# Patient Record
Sex: MALE | Race: WHITE | NOT HISPANIC OR LATINO | Employment: FULL TIME | URBAN - METROPOLITAN AREA
[De-identification: names, ages, dates, MRNs, and addresses within clinical notes are randomized per-mention and may not be internally consistent; named-entity substitution may affect disease eponyms.]

---

## 2017-01-17 ENCOUNTER — COMMUNICATION - HEALTHEAST (OUTPATIENT)
Dept: FAMILY MEDICINE | Facility: CLINIC | Age: 37
End: 2017-01-17

## 2017-01-17 DIAGNOSIS — F17.200 TOBACCO USE DISORDER: ICD-10-CM

## 2017-10-04 ENCOUNTER — COMMUNICATION - HEALTHEAST (OUTPATIENT)
Dept: FAMILY MEDICINE | Facility: CLINIC | Age: 37
End: 2017-10-04

## 2017-10-04 DIAGNOSIS — F17.200 TOBACCO USE DISORDER: ICD-10-CM

## 2018-02-12 ENCOUNTER — TELEPHONE (OUTPATIENT)
Dept: OTHER | Facility: CLINIC | Age: 38
End: 2018-02-12

## 2018-02-12 NOTE — TELEPHONE ENCOUNTER
2/12/2018    Call Regarding Onboarding P1 Other    Attempt 1    Message on voicemail     Comments:       Outreach   Kimi Winslow

## 2018-03-05 ENCOUNTER — RECORDS - HEALTHEAST (OUTPATIENT)
Dept: ADMINISTRATIVE | Facility: OTHER | Age: 38
End: 2018-03-05

## 2018-04-04 ENCOUNTER — RECORDS - HEALTHEAST (OUTPATIENT)
Dept: ADMINISTRATIVE | Facility: OTHER | Age: 38
End: 2018-04-04

## 2018-05-16 ENCOUNTER — COMMUNICATION - HEALTHEAST (OUTPATIENT)
Dept: FAMILY MEDICINE | Facility: CLINIC | Age: 38
End: 2018-05-16

## 2018-05-21 ENCOUNTER — HOSPITAL ENCOUNTER (OUTPATIENT)
Dept: MRI IMAGING | Facility: CLINIC | Age: 38
Discharge: HOME | End: 2018-05-21
Attending: FAMILY MEDICINE

## 2018-05-21 ENCOUNTER — OFFICE VISIT - HEALTHEAST (OUTPATIENT)
Dept: FAMILY MEDICINE | Facility: CLINIC | Age: 38
End: 2018-05-21

## 2018-05-21 DIAGNOSIS — M54.50 LUMBAGO: ICD-10-CM

## 2018-05-21 DIAGNOSIS — F17.200 TOBACCO USE DISORDER: ICD-10-CM

## 2018-05-21 DIAGNOSIS — Z00.00 WELL ADULT EXAM: ICD-10-CM

## 2018-05-21 DIAGNOSIS — E78.5 HYPERLIPIDEMIA LDL GOAL <130: ICD-10-CM

## 2018-05-21 DIAGNOSIS — M54.16 LUMBAR RADICULOPATHY: ICD-10-CM

## 2018-05-21 LAB
ALBUMIN SERPL-MCNC: 4 G/DL (ref 3.5–5)
ALP SERPL-CCNC: 102 U/L (ref 45–120)
ALT SERPL W P-5'-P-CCNC: 37 U/L (ref 0–45)
ANION GAP SERPL CALCULATED.3IONS-SCNC: 11 MMOL/L (ref 5–18)
AST SERPL W P-5'-P-CCNC: 40 U/L (ref 0–40)
BILIRUB DIRECT SERPL-MCNC: 0.1 MG/DL
BILIRUB SERPL-MCNC: 0.4 MG/DL (ref 0–1)
BUN SERPL-MCNC: 18 MG/DL (ref 8–22)
CALCIUM SERPL-MCNC: 9.6 MG/DL (ref 8.5–10.5)
CHLORIDE BLD-SCNC: 103 MMOL/L (ref 98–107)
CO2 SERPL-SCNC: 26 MMOL/L (ref 22–31)
CREAT SERPL-MCNC: 1.13 MG/DL (ref 0.7–1.3)
ERYTHROCYTE [DISTWIDTH] IN BLOOD BY AUTOMATED COUNT: 12 % (ref 11–14.5)
GFR SERPL CREATININE-BSD FRML MDRD: >60 ML/MIN/1.73M2
GLUCOSE BLD-MCNC: 76 MG/DL (ref 70–125)
HCT VFR BLD AUTO: 44.7 % (ref 40–54)
HGB BLD-MCNC: 14.7 G/DL (ref 14–18)
LDLC SERPL CALC-MCNC: 95 MG/DL
MCH RBC QN AUTO: 28.9 PG (ref 27–34)
MCHC RBC AUTO-ENTMCNC: 32.9 G/DL (ref 32–36)
MCV RBC AUTO: 88 FL (ref 80–100)
PLATELET # BLD AUTO: 321 THOU/UL (ref 140–440)
PMV BLD AUTO: 7.2 FL (ref 7–10)
POTASSIUM BLD-SCNC: 4.6 MMOL/L (ref 3.5–5)
PROT SERPL-MCNC: 7.5 G/DL (ref 6–8)
RBC # BLD AUTO: 5.09 MILL/UL (ref 4.4–6.2)
SODIUM SERPL-SCNC: 140 MMOL/L (ref 136–145)
WBC: 7.9 THOU/UL (ref 4–11)

## 2018-05-21 ASSESSMENT — MIFFLIN-ST. JEOR: SCORE: 1719.68

## 2018-05-22 ENCOUNTER — HOSPITAL ENCOUNTER (OUTPATIENT)
Dept: PHYSICAL MEDICINE AND REHAB | Facility: CLINIC | Age: 38
Discharge: HOME | End: 2018-05-22
Attending: FAMILY MEDICINE

## 2018-05-22 ENCOUNTER — COMMUNICATION - HEALTHEAST (OUTPATIENT)
Dept: FAMILY MEDICINE | Facility: CLINIC | Age: 38
End: 2018-05-22

## 2018-05-22 DIAGNOSIS — M54.50 LUMBALGIA: ICD-10-CM

## 2018-05-22 DIAGNOSIS — G47.9 SLEEP DISTURBANCE: ICD-10-CM

## 2018-05-22 DIAGNOSIS — M87.052 AVASCULAR NECROSIS OF BONE OF HIP, LEFT (H): ICD-10-CM

## 2018-05-22 DIAGNOSIS — M54.16 LUMBAR RADICULITIS: ICD-10-CM

## 2018-05-22 DIAGNOSIS — M79.18 MYOFASCIAL PAIN: ICD-10-CM

## 2018-05-22 DIAGNOSIS — M51.26 LUMBAR DISC HERNIATION: ICD-10-CM

## 2018-05-22 ASSESSMENT — MIFFLIN-ST. JEOR: SCORE: 1716.96

## 2018-06-06 ENCOUNTER — RECORDS - HEALTHEAST (OUTPATIENT)
Dept: ADMINISTRATIVE | Facility: OTHER | Age: 38
End: 2018-06-06

## 2018-06-06 ENCOUNTER — TRANSFERRED RECORDS (OUTPATIENT)
Dept: HEALTH INFORMATION MANAGEMENT | Facility: CLINIC | Age: 38
End: 2018-06-06

## 2018-06-13 ENCOUNTER — RECORDS - HEALTHEAST (OUTPATIENT)
Dept: ADMINISTRATIVE | Facility: OTHER | Age: 38
End: 2018-06-13

## 2018-06-25 ENCOUNTER — COMMUNICATION - HEALTHEAST (OUTPATIENT)
Dept: FAMILY MEDICINE | Facility: CLINIC | Age: 38
End: 2018-06-25

## 2018-06-26 ENCOUNTER — OFFICE VISIT - HEALTHEAST (OUTPATIENT)
Dept: FAMILY MEDICINE | Facility: CLINIC | Age: 38
End: 2018-06-26

## 2018-06-26 DIAGNOSIS — Z01.818 ENCOUNTER FOR PREOPERATIVE EXAMINATION FOR GENERAL SURGICAL PROCEDURE: ICD-10-CM

## 2018-06-26 DIAGNOSIS — M87.052 AVASCULAR NECROSIS OF HIP, LEFT (H): ICD-10-CM

## 2018-06-26 LAB
ANION GAP SERPL CALCULATED.3IONS-SCNC: 9 MMOL/L (ref 5–18)
BUN SERPL-MCNC: 18 MG/DL (ref 8–22)
CALCIUM SERPL-MCNC: 10.2 MG/DL (ref 8.5–10.5)
CHLORIDE BLD-SCNC: 107 MMOL/L (ref 98–107)
CO2 SERPL-SCNC: 24 MMOL/L (ref 22–31)
CREAT SERPL-MCNC: 1.07 MG/DL (ref 0.7–1.3)
ERYTHROCYTE [DISTWIDTH] IN BLOOD BY AUTOMATED COUNT: 12.3 % (ref 11–14.5)
GFR SERPL CREATININE-BSD FRML MDRD: >60 ML/MIN/1.73M2
GLUCOSE BLD-MCNC: 74 MG/DL (ref 70–125)
HCT VFR BLD AUTO: 45.6 % (ref 40–54)
HGB BLD-MCNC: 15.1 G/DL (ref 14–18)
MCH RBC QN AUTO: 28.6 PG (ref 27–34)
MCHC RBC AUTO-ENTMCNC: 33.1 G/DL (ref 32–36)
MCV RBC AUTO: 86 FL (ref 80–100)
PLATELET # BLD AUTO: 298 THOU/UL (ref 140–440)
PMV BLD AUTO: 7.4 FL (ref 7–10)
POTASSIUM BLD-SCNC: 4.5 MMOL/L (ref 3.5–5)
RBC # BLD AUTO: 5.29 MILL/UL (ref 4.4–6.2)
SODIUM SERPL-SCNC: 140 MMOL/L (ref 136–145)
WBC: 5.6 THOU/UL (ref 4–11)

## 2018-06-26 ASSESSMENT — MIFFLIN-ST. JEOR: SCORE: 1729.32

## 2018-07-19 ENCOUNTER — RECORDS - HEALTHEAST (OUTPATIENT)
Dept: ADMINISTRATIVE | Facility: OTHER | Age: 38
End: 2018-07-19

## 2018-07-22 ENCOUNTER — APPOINTMENT (OUTPATIENT)
Dept: CT IMAGING | Facility: CLINIC | Age: 38
End: 2018-07-22
Attending: EMERGENCY MEDICINE
Payer: COMMERCIAL

## 2018-07-22 ENCOUNTER — RECORDS - HEALTHEAST (OUTPATIENT)
Dept: ADMINISTRATIVE | Facility: OTHER | Age: 38
End: 2018-07-22

## 2018-07-22 ENCOUNTER — HOSPITAL ENCOUNTER (EMERGENCY)
Facility: CLINIC | Age: 38
Discharge: HOME | End: 2018-07-22
Attending: EMERGENCY MEDICINE | Admitting: EMERGENCY MEDICINE
Payer: COMMERCIAL

## 2018-07-22 VITALS
HEART RATE: 78 BPM | OXYGEN SATURATION: 92 % | DIASTOLIC BLOOD PRESSURE: 88 MMHG | TEMPERATURE: 98 F | SYSTOLIC BLOOD PRESSURE: 121 MMHG | RESPIRATION RATE: 9 BRPM

## 2018-07-22 DIAGNOSIS — Z98.890 S/P ARTHROSCOPY: ICD-10-CM

## 2018-07-22 DIAGNOSIS — R06.00 DYSPNEA, UNSPECIFIED TYPE: ICD-10-CM

## 2018-07-22 LAB
ANION GAP SERPL CALCULATED.3IONS-SCNC: 7 MMOL/L (ref 3–14)
BASOPHILS # BLD AUTO: 0.1 10E9/L (ref 0–0.2)
BASOPHILS NFR BLD AUTO: 0.7 %
BUN SERPL-MCNC: 11 MG/DL (ref 7–30)
CALCIUM SERPL-MCNC: 8.7 MG/DL (ref 8.5–10.1)
CHLORIDE SERPL-SCNC: 104 MMOL/L (ref 94–109)
CO2 SERPL-SCNC: 30 MMOL/L (ref 20–32)
CREAT SERPL-MCNC: 0.96 MG/DL (ref 0.66–1.25)
DIFFERENTIAL METHOD BLD: ABNORMAL
EOSINOPHIL # BLD AUTO: 0.2 10E9/L (ref 0–0.7)
EOSINOPHIL NFR BLD AUTO: 2.4 %
ERYTHROCYTE [DISTWIDTH] IN BLOOD BY AUTOMATED COUNT: 14.6 % (ref 10–15)
GFR SERPL CREATININE-BSD FRML MDRD: 88 ML/MIN/1.7M2
GLUCOSE SERPL-MCNC: 91 MG/DL (ref 70–99)
HCT VFR BLD AUTO: 39.2 % (ref 40–53)
HGB BLD-MCNC: 12.9 G/DL (ref 13.3–17.7)
IMM GRANULOCYTES # BLD: 0.1 10E9/L (ref 0–0.4)
IMM GRANULOCYTES NFR BLD: 0.7 %
LYMPHOCYTES # BLD AUTO: 1.3 10E9/L (ref 0.8–5.3)
LYMPHOCYTES NFR BLD AUTO: 16.6 %
MCH RBC QN AUTO: 28.9 PG (ref 26.5–33)
MCHC RBC AUTO-ENTMCNC: 32.9 G/DL (ref 31.5–36.5)
MCV RBC AUTO: 88 FL (ref 78–100)
MONOCYTES # BLD AUTO: 0.6 10E9/L (ref 0–1.3)
MONOCYTES NFR BLD AUTO: 7.5 %
NEUTROPHILS # BLD AUTO: 5.4 10E9/L (ref 1.6–8.3)
NEUTROPHILS NFR BLD AUTO: 72.1 %
NRBC # BLD AUTO: 0 10*3/UL
NRBC BLD AUTO-RTO: 0 /100
PLATELET # BLD AUTO: 258 10E9/L (ref 150–450)
POTASSIUM SERPL-SCNC: 3.6 MMOL/L (ref 3.4–5.3)
RBC # BLD AUTO: 4.46 10E12/L (ref 4.4–5.9)
SODIUM SERPL-SCNC: 141 MMOL/L (ref 133–144)
TROPONIN I SERPL-MCNC: <0.015 UG/L (ref 0–0.04)
WBC # BLD AUTO: 7.5 10E9/L (ref 4–11)

## 2018-07-22 PROCEDURE — 85025 COMPLETE CBC W/AUTO DIFF WBC: CPT | Performed by: EMERGENCY MEDICINE

## 2018-07-22 PROCEDURE — 84484 ASSAY OF TROPONIN QUANT: CPT | Performed by: EMERGENCY MEDICINE

## 2018-07-22 PROCEDURE — 25000128 H RX IP 250 OP 636: Performed by: EMERGENCY MEDICINE

## 2018-07-22 PROCEDURE — 71260 CT THORAX DX C+: CPT

## 2018-07-22 PROCEDURE — 80048 BASIC METABOLIC PNL TOTAL CA: CPT | Performed by: EMERGENCY MEDICINE

## 2018-07-22 PROCEDURE — 99284 EMERGENCY DEPT VISIT MOD MDM: CPT | Mod: 25

## 2018-07-22 PROCEDURE — 93005 ELECTROCARDIOGRAM TRACING: CPT

## 2018-07-22 RX ORDER — IOPAMIDOL 755 MG/ML
500 INJECTION, SOLUTION INTRAVASCULAR ONCE
Status: COMPLETED | OUTPATIENT
Start: 2018-07-22 | End: 2018-07-22

## 2018-07-22 RX ORDER — KETOROLAC TROMETHAMINE 15 MG/ML
15 INJECTION, SOLUTION INTRAMUSCULAR; INTRAVENOUS ONCE
Status: COMPLETED | OUTPATIENT
Start: 2018-07-22 | End: 2018-07-22

## 2018-07-22 RX ORDER — SODIUM CHLORIDE 9 MG/ML
INJECTION, SOLUTION INTRAVENOUS CONTINUOUS
Status: DISCONTINUED | OUTPATIENT
Start: 2018-07-22 | End: 2018-07-22 | Stop reason: HOSPADM

## 2018-07-22 RX ADMIN — SODIUM CHLORIDE 80 ML: 900 INJECTION, SOLUTION INTRAVENOUS at 12:42

## 2018-07-22 RX ADMIN — KETOROLAC TROMETHAMINE 15 MG: 15 INJECTION, SOLUTION INTRAMUSCULAR; INTRAVENOUS at 12:05

## 2018-07-22 RX ADMIN — IOPAMIDOL 65 ML: 755 INJECTION, SOLUTION INTRAVENOUS at 12:42

## 2018-07-22 RX ADMIN — SODIUM CHLORIDE: 9 INJECTION, SOLUTION INTRAVENOUS at 12:15

## 2018-07-22 NOTE — ED TRIAGE NOTES
ED Triage Note:    EMS arrival after patient called because of shortness of breath, vomiting and dizziness following pain medication this AM.  He states that he woke up this morning and took his Oxycodone, started feeling short of breath and had some chest discomfort.  He then vomited, felt lightheaded and dizzy.      He is currently stating that his chest burns but is not nauseated.     Had an hip procedure done 4 days ago for avascular necrosis of the left hip.  States that he took a long time to recover from the anaethesia.

## 2018-07-22 NOTE — ED AVS SNAPSHOT
[facility] Emergency Department    [address]    [facility] [address]    Phone:  [phone]    Fax:  [phone]                                       Jimmy López   MRN: [id number]    Department:  [facility] Emergency Department   Date of Visit:  7/22/2018           After Visit Summary Signature Page     I have received my discharge instructions, and my questions have been answered. I have discussed any challenges I see with this plan with the nurse or doctor.    ..........................................................................................................................................  Patient/Patient Representative Signature      ..........................................................................................................................................  Patient Representative Print Name and Relationship to Patient    ..................................................               ................................................  Date                                            Time    ..........................................................................................................................................  Reviewed by Signature/Title    ...................................................              ..............................................  Date                                                            Time

## 2018-07-22 NOTE — DISCHARGE INSTRUCTIONS
Pain medications:    1.  Acetaminophen 1000mg three times a day for the next 5 days, scheduled during day  2.  Oxycodone 5mg tablet, one tablet every 6 hours. May take one additional pill if needed but try not to.  Over next 2 days, you can start taking the oxycodone only if you need, not on a scheduled basis.   3.  Try not to use the hydroxyzine medication due to sleepiness but can take one before bed if you need.   4.  Continue the diclofenac medication.     Do your incentive inspirometer (breathing machine we gave you) every hour while you are awake for the next 3 days.     Shortness of Breath (Dyspnea)  Shortness of breath is the feeling that you can't catch your breath or get enough air. It is also known as dyspnea.  Dyspnea can be caused by many different conditions. They include:    Acute asthma attack    Worsening of chronic lung diseases such as chronic bronchitis and emphysema    Heart failure. This is when weak heart muscle allows extra fluid to collect in the lungs.    Panic attacks or anxiety. Fear can cause rapid breathing (hyperventilation).    Pneumonia, or an infection in the lung tissue    Exposure to toxic substances, fumes, smoke, or certain medicines    Blood clot in the lung (pulmonary embolism). This is often from a piece of blood clot in a deep vein of the leg (deep vein thrombosis) that breaks off and travels to the lungs.    Heart attack or heart-related chest pain (angina)    Anemia    Collapsed lung (pneumothorax)    Dehydration    Pregnancy  Based on your visit today, the exact cause of your shortness of breath is not certain. Your tests don t show any of the serious causes of dyspnea. You may need other tests to find out if you have a serious problem. It s important to watch for any new symptoms or symptoms that get worse. Follow up with your healthcare provider as directed.  Home care  Follow these tips to take care of yourself at home:    When your symptoms are better, go back to  your usual activities.    If you smoke, you should stop. Join a quit-smoking program or ask your healthcare provider for help.    Eat a healthy diet and get plenty of sleep.    Get regular exercise. Talk with your healthcare provider before starting to exercise, especially if you have other medical problems.    Cut down on the amount of caffeine and stimulants you consume.  Follow-up care  Follow up with your healthcare provider, or as advised.  If tests were done, you will be told if your treatment needs to be changed. You can call as directed for the results.  If an X-ray was taken, a specialist will review it. You will be notified of any new findings that may affect your care.  Call 911  Shortness of breath may be a sign of a serious medical problem. For example, it may be a problem with your heart or lungs. Call 911 if you have worsening shortness of breath or trouble breathing, especially with any of the symptoms below:    You are confused or it s difficult to wake you.    You faint or lose consciousness.    You have a fast heartbeat, or your heartbeat is irregular.    You are coughing up blood.    You have pain in your chest, arm, shoulder, neck, or upper back.    You break out in a sweat.  When to seek medical advice  Call your healthcare provider right away if any of these occur:    Slight shortness of breath or wheezing    Redness, pain or swelling in your leg, arm, or other body area    Swelling in both legs or ankles    Fast weight gain    Dizziness or weakness    Fever of 100.4 F (38 C) or higher, or as directed by your healthcare provider  Date Last Reviewed: 9/13/2015 2000-2017 [employer]. [address] [place] [address], [place] [address]. All rights reserved. This information is not intended as a substitute for professional medical care. Always follow your healthcare professional's instructions.

## 2018-07-22 NOTE — ED NOTES
Bed: ED10  Expected date: 7/22/18  Expected time:   Means of arrival: Ambulance  Comments:  Ger Olivares

## 2018-07-22 NOTE — ED PROVIDER NOTES
History     Chief Complaint:  Shortness of Breath    HPI   Jimmy López is a 38 year old male who presents to the emergency department today via EMS for evaluation of shortness of breath.  The patient is rehabbing from a left hip surgery.  The patient notes that he is pain medicine this morning and had some anterior chest pain that is pleuritic in nature as well as some dyspnea he feels a bit better.  Family is noted he does appear a bit sleepy but he did not sleep well last night.  The patient was noted by the RN to have slightly lower oxygen saturations.  The patient has had no cough, hemoptysis, fevers history of DVT or PE, left leg swelling or other new symptoms..    Allergies:  No Known Drug Allergies    Medications:    Zoloft  Wellbutrin     Past Medical History:    Sciatica  Hyperlipidemia  PTSD  Depression  Anxiety  Lumbar herniated disc    Past Surgical History:    History reviewed. No pertinent past surgical history.    Family History:    Diabetes  Depression  Cancer    Social History:  The patient was accompanied to the ED by family.  Smoking Status: Current Every Day Smoker  Smokeless Tobacco: Never  Alcohol Use: Yes  Marital Status:       Review of Systems  See HPI a 10 point review of systems was assessed and is otherwise negative for acute abnormality.    Physical Exam     Patient Vitals for the past 24 hrs:   BP Temp Temp src Pulse Resp SpO2   07/22/18 1136 141/87 98  F (36.7  C) Oral 78 16 92 %         Physical Exam  General: Patient is alert and interactive when I enter the room but he appears sleepy on exam  Head:  The scalp, face, and head appear normal  Eyes:  The pupils are equal, round, and reactive to light    Conjunctivae and sclerae are normal  ENT:    External acoustic canals are normal    The oropharynx is normal without erythema.     Uvula is in the midline  Neck:  Normal range of motion  CV:  Regular rate. S1/S2. No murmurs.   Resp:  Lungs are clear without wheezes or  rales. No distress  GI:  Abdomen is soft, no rigidity, guarding, or rebound    No distension. No tenderness to palpation in any quadrant.     MS:  Normal tone. Joints grossly normal without effusions.     No asymmetric leg swelling, calf or thigh tenderness.      Normal motor assessment of all extremities.  I did not do range of motion of the left hip as is in a brace.  There is no left leg swelling however.  Skin:  No rash or lesions noted. Normal capillary refill noted  Neuro: Speech is normal and fluent. Face is symmetric.     Moving all extremities well.   Psych:  Awake. Alert.  Normal affect.  Appropriate interactions.  Lymph: No anterior cervical lymphadenopathy noted        Emergency Department Course     ECG:  EKG at 11:45 AM: Ventricular rate 78, AL interval 130, QRS is 100, QTc 442, axis is normal, there is no ST elevation, there is no significant ST depression.  There are nonspecific changes but not into contiguous leads that are worrisome.  Interpretation normal EKG.    Imaging:  Radiology findings were communicated with the patient and family who voiced understanding of the findings.  CT Chest Pulmonary Embolism w Contrast  IMPRESSION:   1. No pulmonary embolism demonstrated.  2. No thoracic aortic dissection or aneurysm.   3. 3 mm right upper lobe nodule.  Report per radiology     Laboratory:  Laboratory findings were communicated with the patient and family who voiced understanding of the findings.  CBC: HGB 12.9 (L) o/w WNL. (WBC 7.5, )   BMP: AWNL (Creatinine 0.96)  Troponin (Collected 1145): <0.015    Interventions:  1205 Toradol 15 mg IV  1215 NS infusion 1,000mL IV    Emergency Department Course:  Nursing notes and vitals reviewed.  IV was inserted and blood was drawn for laboratory testing, results above.  The patient was sent for a CT Chest Pulmonary Embolism w Contrast while in the emergency department, results above.   I performed an exam of the patient as documented above.   Patient  rechecked and updated.   Findings and plan explained to the Patient. Patient discharged home with instructions regarding supportive care, medications, and reasons to return. The importance of close follow-up was reviewed.  I personally reviewed the laboratory, imaging, and EKG results with the Patient and answered all related questions prior to discharge.    Impression & Plan    Medical Decision Makin-year-old male presents for evaluation of dyspnea.  He appears sleepy on initial exam.  A broad differential was of course considered including pulmonary embolism, pneumonia, atelectasis, etc.  The patient looks excellent here and I am not particularly worried about him although he does look like he is a bit overmedicated with multiple sedating medications prescribed him after surgery.  I recommended that we change her on his pain regimen a little bit to make him less sedated.  We will give him incentive inspirometer to help as well with this the patient is stable for discharge home with the CT findings as noted above in the blood work as noted above.  I do not would not hospitalize at this point for acute coronary syndrome.    Diagnosis:    ICD-10-CM    1. Dyspnea, unspecified type R06.00    2. S/P arthroscopy Z98.890        Disposition:  discharged to home    2018   [facility] EMERGENCY DEPARTMENT       Gume Savage MD  [id number]

## 2018-07-22 NOTE — ED NOTES
ED Nursing Note:    Incentive spirometer given to patient and he was instructed in it's use.  Return demonstration adequate.    Encouraged him to use it every 1-2 hours 5-10 times per session.   Explained the purpose of IS, prevention of pneumonia and promote good lung expansion.   Verbalized and demonstrated understanding of instructions.

## 2018-07-22 NOTE — ED AVS SNAPSHOT
[facility]    [address]    [facility] [address]    Phone:  [phone]    Fax:  [phone]                                       Jimmy López   MRN: [id number]    Department:  [facility]   Date of Visit:  7/22/2018           Patient Information     Date Of Birth          1980        Your diagnoses for this visit were:     Dyspnea, unspecified type     S/P arthroscopy        You were seen by Gume Savage MD.      Follow-up Information     Please follow up.    Why:  your orthopedic surgeon next week        Discharge Instructions         Pain medications:    1.  Acetaminophen 1000mg three times a day for the next 5 days, scheduled during day  2.  Oxycodone 5mg tablet, one tablet every 6 hours. May take one additional pill if needed but try not to.  Over next 2 days, you can start taking the oxycodone only if you need, not on a scheduled basis.   3.  Try not to use the hydroxyzine medication due to sleepiness but can take one before bed if you need.   4.  Continue the diclofenac medication.     Do your incentive inspirometer (breathing machine we gave you) every hour while you are awake for the next 3 days.     Shortness of Breath (Dyspnea)  Shortness of breath is the feeling that you can't catch your breath or get enough air. It is also known as dyspnea.  Dyspnea can be caused by many different conditions. They include:    Acute asthma attack    Worsening of chronic lung diseases such as chronic bronchitis and emphysema    Heart failure. This is when weak heart muscle allows extra fluid to collect in the lungs.    Panic attacks or anxiety. Fear can cause rapid breathing (hyperventilation).    Pneumonia, or an infection in the lung tissue    Exposure to toxic substances, fumes, smoke, or certain medicines    Blood clot in the lung (pulmonary embolism). This is often from a piece of blood clot in a deep vein of the  leg (deep vein thrombosis) that breaks off and travels to the lungs.    Heart attack or heart-related chest pain (angina)    Anemia    Collapsed lung (pneumothorax)    Dehydration    Pregnancy  Based on your visit today, the exact cause of your shortness of breath is not certain. Your tests don t show any of the serious causes of dyspnea. You may need other tests to find out if you have a serious problem. It s important to watch for any new symptoms or symptoms that get worse. Follow up with your healthcare provider as directed.  Home care  Follow these tips to take care of yourself at home:    When your symptoms are better, go back to your usual activities.    If you smoke, you should stop. Join a quit-smoking program or ask your healthcare provider for help.    Eat a healthy diet and get plenty of sleep.    Get regular exercise. Talk with your healthcare provider before starting to exercise, especially if you have other medical problems.    Cut down on the amount of caffeine and stimulants you consume.  Follow-up care  Follow up with your healthcare provider, or as advised.  If tests were done, you will be told if your treatment needs to be changed. You can call as directed for the results.  If an X-ray was taken, a specialist will review it. You will be notified of any new findings that may affect your care.  Call 911  Shortness of breath may be a sign of a serious medical problem. For example, it may be a problem with your heart or lungs. Call 911 if you have worsening shortness of breath or trouble breathing, especially with any of the symptoms below:    You are confused or it s difficult to wake you.    You faint or lose consciousness.    You have a fast heartbeat, or your heartbeat is irregular.    You are coughing up blood.    You have pain in your chest, arm, shoulder, neck, or upper back.    You break out in a sweat.  When to seek medical advice  Call your healthcare provider right away if any of these  occur:    Slight shortness of breath or wheezing    Redness, pain or swelling in your leg, arm, or other body area    Swelling in both legs or ankles    Fast weight gain    Dizziness or weakness    Fever of 100.4 F (38 C) or higher, or as directed by your healthcare provider  Date Last Reviewed: 9/13/2015 2000-2017 [employer]. [address] [place] [address]. All rights reserved. This information is not intended as a substitute for professional medical care. Always follow your healthcare professional's instructions.          24 Hour Appointment Hotline       To make an appointment at any [facility], call [phone] ([phone]). If you don't have a family doctor or clinic, we will help you find one. [facility] clinics are conveniently located to serve the needs of you and your family.             Review of your medicines      Our records show that you are taking the medicines listed below. If these are incorrect, please call your family doctor or clinic.        Dose / Directions Last dose taken    DICLOFENAC SODIUM PO   Dose:  75 mg        Take 75 mg by mouth 2 times daily   Refills:  0        HYDROXYZINE PAMOATE PO   Dose:  25 mg        Take 25 mg by mouth every 4 hours as needed for itching   Refills:  0        OXYCODONE HCL PO   Dose:  5 mg        Take 5 mg by mouth every 4 hours as needed   Refills:  0        SERTRALINE HCL PO   Dose:  100 mg        Take 100 mg by mouth daily   Refills:  0                Information about OPIOIDS     PRESCRIPTION OPIOIDS: WHAT YOU NEED TO KNOW   We gave you an opioid (narcotic) pain medicine. It is important to manage your pain, but opioids are not always the best choice. You should first try all the other options your care team gave you. Take this medicine for as short a time (and as few doses) as possible.     These medicines have risks:    DO NOT drive when on new or higher doses of pain medicine. These medicines can affect your  alertness and reaction times, and you could be arrested for driving under the influence (DUI). If you need to use opioids long-term, talk to your care team about driving.    DO NOT operate heave machinery    DO NOT do any other dangerous activities while taking these medicines.     DO NOT drink any alcohol while taking these medicines.      If the opioid prescribed includes acetaminophen, DO NOT take with any other medicines that contain acetaminophen. Read all labels carefully. Look for the word  acetaminophen  or  Tylenol.  Ask your pharmacist if you have questions or are unsure.    You can get addicted to pain medicines, especially if you have a history of addiction (chemical, alcohol or substance dependence). Talk to your care team about ways to reduce this risk.    Store your pills in a secure place, locked if possible. We will not replace any lost or stolen medicine. If you don t finish your medicine, please throw away (dispose) as directed by your pharmacist. The [place] Pollution Control Agency has more information about safe disposal: https://[email or web address].[email or web address]/living-[email or web address]/[email or web address]-medications.     All opioids tend to cause constipation. Drink plenty of water and eat foods that have a lot of fiber, such as fruits, vegetables, prune juice, apple juice and high-fiber cereal. Take a laxative (Miralax, milk of magnesia, Colace, Senna) if you don t move your bowels at least every other day.         Procedures and tests performed during your visit     Basic metabolic panel    CBC with platelets differential    CT Chest Pulmonary Embolism w Contrast    EKG 12 lead    Peripheral IV catheter    Troponin I      Orders Needing Specimen Collection     None      Pending Results     Date and Time Order Name Status Description    7/22/2018 1151 CT Chest Pulmonary Embolism w Contrast Preliminary     7/22/2018 1130 EKG 12 lead Preliminary             Pending Culture Results     No orders found from 7/20/2018 to  7/23/2018.            Pending Results Instructions     If you had any lab results that were not finalized at the time of your Discharge, you can call the ED Lab Result RN at [phone]. You will be contacted by this team for any positive Lab results or changes in treatment. The nurses are available 7 days a week from 10A to 6:30P.  You can leave a message 24 hours per day and they will return your call.        Test Results From Your Hospital Stay        7/22/2018 12:14 PM      Component Results     Component Value Ref Range & Units Status    WBC 7.5 4.0 - 11.0 10e9/L Final    RBC Count 4.46 4.4 - 5.9 10e12/L Final    Hemoglobin 12.9 (L) 13.3 - 17.7 g/dL Final    Hematocrit 39.2 (L) 40.0 - 53.0 % Final    MCV 88 78 - 100 fl Final    MCH 28.9 26.5 - 33.0 pg Final    MCHC 32.9 31.5 - 36.5 g/dL Final    RDW 14.6 10.0 - 15.0 % Final    Platelet Count 258 150 - 450 10e9/L Final    Diff Method Automated Method  Final    % Neutrophils 72.1 % Final    % Lymphocytes 16.6 % Final    % Monocytes 7.5 % Final    % Eosinophils 2.4 % Final    % Basophils 0.7 % Final    % Immature Granulocytes 0.7 % Final    Nucleated RBCs 0 0 /100 Final    Absolute Neutrophil 5.4 1.6 - 8.3 10e9/L Final    Absolute Lymphocytes 1.3 0.8 - 5.3 10e9/L Final    Absolute Monocytes 0.6 0.0 - 1.3 10e9/L Final    Absolute Eosinophils 0.2 0.0 - 0.7 10e9/L Final    Absolute Basophils 0.1 0.0 - 0.2 10e9/L Final    Abs Immature Granulocytes 0.1 0 - 0.4 10e9/L Final    Absolute Nucleated RBC 0.0  Final         7/22/2018 12:36 PM      Component Results     Component Value Ref Range & Units Status    Sodium 141 133 - 144 mmol/L Final    Potassium 3.6 3.4 - 5.3 mmol/L Final    Chloride 104 94 - 109 mmol/L Final    Carbon Dioxide 30 20 - 32 mmol/L Final    Anion Gap 7 3 - 14 mmol/L Final    Glucose 91 70 - 99 mg/dL Final    Urea Nitrogen 11 7 - 30 mg/dL Final    Creatinine 0.96 0.66 - 1.25 mg/dL Final    GFR Estimate 88 >60 mL/min/1.7m2 Final    Non   American GFR Calc    GFR Estimate If Black >90 >60 mL/min/1.7m2 Final    African American GFR Calc    Calcium 8.7 8.5 - 10.1 mg/dL Final         7/22/2018 12:58 PM      Narrative     CT CHEST PULMONARY EMBOLISM WITH CONTRAST  7/22/2018 12:53 PM     HISTORY: Recent hip surgery.    COMPARISON: None.    TECHNIQUE: Volumetric helical acquisition of CT images of the chest  from the lung apices to the kidneys were acquired after the  administration of 65mL Isovue-370 IV contrast. Radiation dose for this  scan was reduced using automated exposure control, adjustment of the  mA and/or kV according to patient size, or iterative reconstruction  technique.    FINDINGS: There is no pulmonary embolism. 3 mm nodule in the lateral  right upper lobe image 131 series 6. Minimal bibasilar probable  atelectasis versus less likely infiltrate. Trace pleural fluid  bilaterally. The heart is not enlarged. Thoracic aorta is unremarkable  without evidence of dissection or aneurysm. No pericardial effusion.   There is no pneumothorax. Adrenal glands are not well seen. Remainder  of the visualized upper abdomen is unremarkable.        Impression     IMPRESSION:   1. No pulmonary embolism demonstrated.  2. No thoracic aortic dissection or aneurysm.   3. 3 mm right upper lobe nodule.    Recommendations for one or multiple incidental lung nodules < 6mm :    Low risk patients: No routine follow-up.    High risk patients: Optional follow-up CT at 12 months; if  unchanged, no further follow-up.    *Low Risk: Minimal or absent history of smoking or other known risk  factors.  *Nonsolid (ground glass) or partly solid nodules may require longer  follow-up to exclude indolent adenocarcinoma.  *Recommendations based on Guidelines for the Management of Incidental  Pulmonary Nodules Detected at CT: From the Fleischner Society 2017,  Radiology 2017.         7/22/2018 12:36 PM      Component Results     Component Value Ref Range & Units Status    Troponin I  ES <0.015 0.000 - 0.045 ug/L Final    The 99th percentile for upper reference range is 0.045 ug/L.  Troponin values   in the range of 0.045 - 0.120 ug/L may be associated with risks of adverse   clinical events.                  Clinical Quality Measure: Blood Pressure Screening     Your blood pressure was checked while you were in the emergency department today. The last reading we obtained was  BP: 121/88 . Please read the guidelines below about what these numbers mean and what you should do about them.  If your systolic blood pressure (the top number) is less than 120 and your diastolic blood pressure (the bottom number) is less than 80, then your blood pressure is normal. There is nothing more that you need to do about it.  If your systolic blood pressure (the top number) is 120-139 or your diastolic blood pressure (the bottom number) is 80-89, your blood pressure may be higher than it should be. You should have your blood pressure rechecked within a year by a primary care provider.  If your systolic blood pressure (the top number) is 140 or greater or your diastolic blood pressure (the bottom number) is 90 or greater, you may have high blood pressure. High blood pressure is treatable, but if left untreated over time it can put you at risk for heart attack, stroke, or kidney failure. You should have your blood pressure rechecked by a primary care provider within the next 4 weeks.  If your provider in the emergency department today gave you specific instructions to follow-up with your doctor or provider even sooner than that, you should follow that instruction and not wait for up to 4 weeks for your follow-up visit.        Thank you for choosing [facility]       Thank you for choosing [facility] for your care. Our goal is always to provide you with excellent care. Hearing back from our patients is one way we can continue to improve our services. Please take a few minutes to complete the written survey that you may  "receive in the mail after you visit with us. Thank you!        [employer] Information     [employer] lets you send messages to your doctor, view your test results, renew your prescriptions, schedule appointments and more. To sign up, go to [email or web address] . Click on \"Log in\" on the left side of the screen, which will take you to the Welcome page. Then click on \"Sign up Now\" on the right side of the page.     You will be asked to enter the access code listed below, as well as some personal information. Please follow the directions to create your username and password.     Your access code is: [email or web address]  Expires: 10/20/2018  1:10 PM     Your access code will  in 90 days. If you need help or a new code, please call your [place] clinic or [phone].        Care EveryWhere ID     This is your Care EveryWhere ID. This could be used by other organizations to access your [place] medical records  [id number]        Equal Access to Services     AUSTIN GRIGGS : Hadleonarda rayo Soyoan, waaxda luqadaha, qaybta kaalmada claritza, reynold cantu. So [facility] [phone].    ATENCIÓN: Si habla español, tiene a byrne disposición servicios gratuitos de asistencia lingüística. Llame al [phone].    We comply with applicable federal civil rights laws and [place] laws. We do not discriminate on the basis of race, color, national origin, age, disability, sex, sexual orientation, or gender identity.            After Visit Summary       This is your record. Keep this with you and show to your community pharmacist(s) and doctor(s) at your next visit.                  "

## 2018-07-23 LAB — INTERPRETATION ECG - MUSE: NORMAL

## 2018-08-23 ENCOUNTER — RECORDS - HEALTHEAST (OUTPATIENT)
Dept: ADMINISTRATIVE | Facility: OTHER | Age: 38
End: 2018-08-23

## 2018-10-03 ENCOUNTER — RECORDS - HEALTHEAST (OUTPATIENT)
Dept: ADMINISTRATIVE | Facility: OTHER | Age: 38
End: 2018-10-03

## 2018-11-21 ENCOUNTER — OFFICE VISIT - HEALTHEAST (OUTPATIENT)
Dept: FAMILY MEDICINE | Facility: CLINIC | Age: 38
End: 2018-11-21

## 2018-11-21 DIAGNOSIS — F43.10 PTSD (POST-TRAUMATIC STRESS DISORDER): ICD-10-CM

## 2018-11-21 DIAGNOSIS — E78.5 HYPERLIPIDEMIA LDL GOAL <130: ICD-10-CM

## 2018-11-21 DIAGNOSIS — F17.200 TOBACCO USE DISORDER: ICD-10-CM

## 2018-11-21 DIAGNOSIS — Z01.810 PREOP CARDIOVASCULAR EXAM: ICD-10-CM

## 2018-11-21 DIAGNOSIS — M87.052 AVASCULAR NECROSIS OF HIP, LEFT (H): ICD-10-CM

## 2018-11-21 LAB
ANION GAP SERPL CALCULATED.3IONS-SCNC: 8 MMOL/L (ref 5–18)
ATRIAL RATE - MUSE: 69 BPM
BUN SERPL-MCNC: 14 MG/DL (ref 8–22)
CALCIUM SERPL-MCNC: 10.2 MG/DL (ref 8.5–10.5)
CHLORIDE BLD-SCNC: 105 MMOL/L (ref 98–107)
CO2 SERPL-SCNC: 29 MMOL/L (ref 22–31)
CREAT SERPL-MCNC: 1.02 MG/DL (ref 0.7–1.3)
DIASTOLIC BLOOD PRESSURE - MUSE: NORMAL MMHG
ERYTHROCYTE [DISTWIDTH] IN BLOOD BY AUTOMATED COUNT: 11.3 % (ref 11–14.5)
GFR SERPL CREATININE-BSD FRML MDRD: >60 ML/MIN/1.73M2
GLUCOSE BLD-MCNC: 83 MG/DL (ref 70–125)
HCT VFR BLD AUTO: 46.4 % (ref 40–54)
HGB BLD-MCNC: 15.1 G/DL (ref 14–18)
INTERPRETATION ECG - MUSE: NORMAL
MCH RBC QN AUTO: 28.4 PG (ref 27–34)
MCHC RBC AUTO-ENTMCNC: 32.6 G/DL (ref 32–36)
MCV RBC AUTO: 87 FL (ref 80–100)
P AXIS - MUSE: 51 DEGREES
PLATELET # BLD AUTO: 317 THOU/UL (ref 140–440)
PMV BLD AUTO: 7.4 FL (ref 7–10)
POTASSIUM BLD-SCNC: 4.7 MMOL/L (ref 3.5–5)
PR INTERVAL - MUSE: 124 MS
QRS DURATION - MUSE: 100 MS
QT - MUSE: 388 MS
QTC - MUSE: 415 MS
R AXIS - MUSE: 34 DEGREES
RBC # BLD AUTO: 5.34 MILL/UL (ref 4.4–6.2)
SODIUM SERPL-SCNC: 142 MMOL/L (ref 136–145)
SYSTOLIC BLOOD PRESSURE - MUSE: NORMAL MMHG
T AXIS - MUSE: 28 DEGREES
VENTRICULAR RATE- MUSE: 69 BPM
WBC: 5.2 THOU/UL (ref 4–11)

## 2018-11-21 ASSESSMENT — MIFFLIN-ST. JEOR: SCORE: 1750.87

## 2018-12-19 ENCOUNTER — TRANSFERRED RECORDS (OUTPATIENT)
Dept: HEALTH INFORMATION MANAGEMENT | Facility: CLINIC | Age: 38
End: 2018-12-19

## 2018-12-19 ENCOUNTER — RECORDS - HEALTHEAST (OUTPATIENT)
Dept: ADMINISTRATIVE | Facility: OTHER | Age: 38
End: 2018-12-19

## 2019-01-02 ENCOUNTER — RECORDS - HEALTHEAST (OUTPATIENT)
Dept: ADMINISTRATIVE | Facility: OTHER | Age: 39
End: 2019-01-02

## 2019-02-15 ENCOUNTER — RECORDS - HEALTHEAST (OUTPATIENT)
Dept: ADMINISTRATIVE | Facility: OTHER | Age: 39
End: 2019-02-15

## 2019-03-15 ENCOUNTER — TRANSFERRED RECORDS (OUTPATIENT)
Dept: HEALTH INFORMATION MANAGEMENT | Facility: CLINIC | Age: 39
End: 2019-03-15

## 2019-03-15 ENCOUNTER — RECORDS - HEALTHEAST (OUTPATIENT)
Dept: ADMINISTRATIVE | Facility: OTHER | Age: 39
End: 2019-03-15

## 2019-08-12 ENCOUNTER — COMMUNICATION - HEALTHEAST (OUTPATIENT)
Dept: FAMILY MEDICINE | Facility: CLINIC | Age: 39
End: 2019-08-12

## 2020-05-30 ENCOUNTER — NURSE TRIAGE (OUTPATIENT)
Dept: NURSING | Facility: CLINIC | Age: 40
End: 2020-05-30

## 2020-05-30 ENCOUNTER — ANCILLARY PROCEDURE (OUTPATIENT)
Dept: GENERAL RADIOLOGY | Facility: CLINIC | Age: 40
End: 2020-05-30
Attending: FAMILY MEDICINE
Payer: COMMERCIAL

## 2020-05-30 ENCOUNTER — OFFICE VISIT (OUTPATIENT)
Dept: URGENT CARE | Facility: URGENT CARE | Age: 40
End: 2020-05-30
Payer: COMMERCIAL

## 2020-05-30 VITALS
BODY MASS INDEX: 22.89 KG/M2 | OXYGEN SATURATION: 100 % | HEART RATE: 95 BPM | SYSTOLIC BLOOD PRESSURE: 121 MMHG | TEMPERATURE: 97.5 F | WEIGHT: 168.8 LBS | DIASTOLIC BLOOD PRESSURE: 77 MMHG

## 2020-05-30 DIAGNOSIS — S99.921A TOE INJURY, RIGHT, INITIAL ENCOUNTER: Primary | ICD-10-CM

## 2020-05-30 PROCEDURE — 73660 X-RAY EXAM OF TOE(S): CPT | Mod: RT

## 2020-05-30 PROCEDURE — 36415 COLL VENOUS BLD VENIPUNCTURE: CPT | Performed by: FAMILY MEDICINE

## 2020-05-30 PROCEDURE — 84550 ASSAY OF BLOOD/URIC ACID: CPT | Performed by: FAMILY MEDICINE

## 2020-05-30 PROCEDURE — 99203 OFFICE O/P NEW LOW 30 MIN: CPT | Performed by: FAMILY MEDICINE

## 2020-05-30 RX ORDER — INDOMETHACIN 25 MG/1
25-50 CAPSULE ORAL
Qty: 30 CAPSULE | Refills: 0 | Status: SHIPPED | OUTPATIENT
Start: 2020-05-30 | End: 2020-07-16

## 2020-05-30 NOTE — TELEPHONE ENCOUNTER
Pt calls in saying he thinks he may have Fx his big Right toe Monday     Says he stubbed his toe against a concrete block that night    Since then - his big toe has - and remains swollen   Pain has increased to entire foot    When tries to walk on > 10/10 pain   Says big toe does look crooked    Pt will go to [facility] now for evaluation     Protocol and care advice reviewed  Caller states understanding of the recommended disposition    Advised to call back if further questions or concerns    Stone Rogel RN / [facility]                  Additional Information    Negative: Major bleeding (actively dripping or spurting) that can't be stopped    Negative: Amputation of toe    Negative: Sounds like a life-threatening emergency to the triager    Looks like a broken bone or dislocated joint (e.g., crooked or deformed)    Negative: High pressure injection injury (e.g., from paint gun, usually work-related    Protocols used: TOE INJURY-A-OH

## 2020-05-30 NOTE — PATIENT INSTRUCTIONS
Rest, ice, elevate  Take indomethacin instead of ibuprofen          Patient Education     Gout    Gout is an inflammation of a joint due to a build-up of gout crystals in the joint fluid. This occurs when there is an excess of uric acid (a normal waste product) in the body. Uric acid builds up in the body when the kidneys are unable to filter enough of it from the blood. This may occur with age. It is also associated with kidney disease. Gout occurs more often in people with obesity, diabetes, high blood pressure, or high levels of fats in the blood. It may run in families. Gout tends to come and go. A flare up of gout is called an attack. Drinking alcohol or eating certain foods (such as shellfish or foods with additives such as high-fructose corn syrup) may increase uric acid levels in the blood and cause a gout attack.  During a gout attack, the affected joint may become a hot, red, swollen and painful. If you have had one attack of gout, you are likely to have another. An attack of gout can be treated with medicine. If these attacks become frequent, a daily medicine may be prescribed to help the kidneys remove uric acid from the body.  Home care  During a gout attack:    Rest painful joints. If gout affects the joints of your foot or leg, you may want to use crutches for the first few days to keep from bearing weight on the affected joint.    When sitting or lying down, raise the painful joint to a level higher than your heart.    Apply an ice pack (ice cubes in a plastic bag wrapped in a thin towel) over the injured area for 20 minutes every 1 to 2 hours the first day for pain relief. Continue this 3 to 4 times a day for swelling and pain.    Avoid alcohol and foods listed below (see Preventing attacks) during a gout attack. Drink extra fluid to help flush the uric acid through your kidneys.    If you were prescribed a medicine to treat gout, take it as your healthcare provider has instructed. Don't skip  doses.    Take anti-inflammatory medicine as directed.     If pain medicines have been prescribed, take them exactly as directed.    Preventing attacks    Minimize or avoid alcohol use. Excess alcohol intake can cause a gout attack.    Limit these foods and beverages:  ? Organ meats, such as kidneys and liver  ? Certain seafoods (anchovies, sardines, shrimp, scallops, herring, mackerel)  ? Wild game, meat extracts and meat gravies  ? Foods and beverages sweetened with high-fructose corn syrup, such as sodas    Eat a healthy diet including low-fat and nonfat dairy, whole grains, and vegetables.    If you are overweight, talk to your healthcare provider about a weight reduction plan. Avoid fasting or extreme low calorie diets (less than 900 calories per day). This will increase uric acid levels in the body.    If you have diabetes or high blood pressure, work with your doctor to manage these conditions.    Protect the joint from injury. Trauma can trigger a gout attack.  Follow-up care  Follow up with your healthcare provider, or as advised.   When to seek medical advice  Call your healthcare provider if you have any of the following:    Fever over 100.4 F (38. C) with worsening joint pain    Increasing redness around the joint    Pain developing in another joint    Repeated vomiting, abdominal pain, or blood in the vomit or stool (black or red color)  Date Last Reviewed: 3/1/2017    0083-3766 [employer]. [address] [place], [place] [id number]. All rights reserved. This information is not intended as a substitute for professional medical care. Always follow your healthcare professional's instructions.           Patient Education     Gout Diet  Gout is a painful condition caused by an excess of uric acid, a waste product made by the body. Uric acid forms crystals that collect in the joints. The immune response to these crystals brings on symptoms of joint pain and swelling. This is called a gout  attack. Often, medications and diet changes are combined to manage gout. Below are some guidelines for changing your diet to help you manage gout and prevent attacks. Your healthcare provider will help you determine the best eating plan for you.  Eating to manage gout  Weight loss for those who are overweight may help reduce gout attacks.  Eat less of these foods  Eating too many foods containing purines may raise the levels of uric acid in your body. This raises your risk for a gout attack. Try to limit these foods and drinks:    Alcohol, such as beer and red wine. You may be told to avoid alcohol completely.    Soft drinks that contain sugar or high fructose corn syrup    Certain fish, including anchovies, sardines, fish eggs, and herring    Shellfish    Certain meats, such as red meat, hot dogs, luncheon meats, and turkey    Organ meats, such as liver, kidneys, and sweetbreads    Legumes, such as dried beans and peas    Other high fat foods such as gravy, whole milk, and high fat cheeses    Vegetables such as asparagus, cauliflower, spinach, and mushrooms used to be thought to contribute to an increased risk for a gout attack, but recent studies show that high purine vegetables don't increase the risk for a gout attack.  Eat more of these foods  Other foods may be helpful for people with gout. Add some of these foods to your diet:    Cherries contain chemicals that may lower uric acid.    Omega fatty acids. These are found in some fatty fish such as salmon, certain oils (flax, olive, or nut), and nuts themselves. Omega fatty acids may help prevent inflammation due to gout.    Dairy products that are low-fat or fat-free, such as cheese and yogurt    Complex carbohydrate foods, including whole grains, brown rice, oats, and beans    Coffee, in moderation    Water, approximately 64 ounces per day  Follow-up care  Follow up with your healthcare provider as advised.  When to seek medical advice  Call your healthcare  provider right away if any of these occur:    Return of gout symptoms, usually at night:    Severe pain, swelling, and heat in a joint, especially the base of the big toe    Affected joint is hard to move    Skin of the affected joint is purple or red    Fever of 100.4 F (38 C) or higher    Pain that doesn't get better even with prescribed medicine   Date Last Reviewed: 6/1/2018 2000-2019 [employer]. [address] [place] [address], [place] [id number]. All rights reserved. This information is not intended as a substitute for professional medical care. Always follow your healthcare professional's instructions.

## 2020-05-31 LAB — URATE SERPL-MCNC: 7.5 MG/DL (ref 3.5–7.2)

## 2020-07-16 ENCOUNTER — OFFICE VISIT (OUTPATIENT)
Dept: PEDIATRICS | Facility: CLINIC | Age: 40
End: 2020-07-16
Payer: COMMERCIAL

## 2020-07-16 VITALS
RESPIRATION RATE: 18 BRPM | DIASTOLIC BLOOD PRESSURE: 64 MMHG | BODY MASS INDEX: 22.54 KG/M2 | WEIGHT: 161 LBS | HEIGHT: 71 IN | HEART RATE: 80 BPM | OXYGEN SATURATION: 97 % | SYSTOLIC BLOOD PRESSURE: 100 MMHG | TEMPERATURE: 97.8 F

## 2020-07-16 DIAGNOSIS — Z71.6 ENCOUNTER FOR SMOKING CESSATION COUNSELING: ICD-10-CM

## 2020-07-16 DIAGNOSIS — F32.A DEPRESSION, UNSPECIFIED DEPRESSION TYPE: ICD-10-CM

## 2020-07-16 DIAGNOSIS — Z00.00 ROUTINE GENERAL MEDICAL EXAMINATION AT A HEALTH CARE FACILITY: Primary | ICD-10-CM

## 2020-07-16 DIAGNOSIS — Z13.1 SCREENING FOR DIABETES MELLITUS: ICD-10-CM

## 2020-07-16 DIAGNOSIS — Z11.3 SCREEN FOR STD (SEXUALLY TRANSMITTED DISEASE): ICD-10-CM

## 2020-07-16 DIAGNOSIS — K90.41 GLUTEN INTOLERANCE: ICD-10-CM

## 2020-07-16 PROCEDURE — 82947 ASSAY GLUCOSE BLOOD QUANT: CPT | Performed by: PEDIATRICS

## 2020-07-16 PROCEDURE — 83516 IMMUNOASSAY NONANTIBODY: CPT | Mod: 59 | Performed by: PEDIATRICS

## 2020-07-16 PROCEDURE — 87591 N.GONORRHOEAE DNA AMP PROB: CPT | Performed by: PEDIATRICS

## 2020-07-16 PROCEDURE — 80061 LIPID PANEL: CPT | Performed by: PEDIATRICS

## 2020-07-16 PROCEDURE — 83516 IMMUNOASSAY NONANTIBODY: CPT | Performed by: PEDIATRICS

## 2020-07-16 PROCEDURE — 87491 CHLMYD TRACH DNA AMP PROBE: CPT | Performed by: PEDIATRICS

## 2020-07-16 PROCEDURE — 87389 HIV-1 AG W/HIV-1&-2 AB AG IA: CPT | Performed by: PEDIATRICS

## 2020-07-16 PROCEDURE — 86780 TREPONEMA PALLIDUM: CPT | Performed by: PEDIATRICS

## 2020-07-16 PROCEDURE — 36415 COLL VENOUS BLD VENIPUNCTURE: CPT | Performed by: PEDIATRICS

## 2020-07-16 PROCEDURE — 84443 ASSAY THYROID STIM HORMONE: CPT | Performed by: PEDIATRICS

## 2020-07-16 PROCEDURE — 99385 PREV VISIT NEW AGE 18-39: CPT | Mod: GC | Performed by: STUDENT IN AN ORGANIZED HEALTH CARE EDUCATION/TRAINING PROGRAM

## 2020-07-16 PROCEDURE — 82784 ASSAY IGA/IGD/IGG/IGM EACH: CPT | Performed by: PEDIATRICS

## 2020-07-16 RX ORDER — NICOTINE 21 MG/24HR
1 PATCH, TRANSDERMAL 24 HOURS TRANSDERMAL EVERY 24 HOURS
Qty: 42 PATCH | Refills: 0 | Status: SHIPPED | OUTPATIENT
Start: 2020-07-16 | End: 2021-07-13

## 2020-07-16 RX ORDER — NICOTINE 21 MG/24HR
1 PATCH, TRANSDERMAL 24 HOURS TRANSDERMAL EVERY 24 HOURS
Qty: 14 PATCH | Refills: 0 | Status: SHIPPED | OUTPATIENT
Start: 2020-07-16 | End: 2021-07-13

## 2020-07-16 ASSESSMENT — ANXIETY QUESTIONNAIRES
1. FEELING NERVOUS, ANXIOUS, OR ON EDGE: NEARLY EVERY DAY
GAD7 TOTAL SCORE: 19
7. FEELING AFRAID AS IF SOMETHING AWFUL MIGHT HAPPEN: SEVERAL DAYS
2. NOT BEING ABLE TO STOP OR CONTROL WORRYING: NEARLY EVERY DAY
3. WORRYING TOO MUCH ABOUT DIFFERENT THINGS: NEARLY EVERY DAY
IF YOU CHECKED OFF ANY PROBLEMS ON THIS QUESTIONNAIRE, HOW DIFFICULT HAVE THESE PROBLEMS MADE IT FOR YOU TO DO YOUR WORK, TAKE CARE OF THINGS AT HOME, OR GET ALONG WITH OTHER PEOPLE: EXTREMELY DIFFICULT
5. BEING SO RESTLESS THAT IT IS HARD TO SIT STILL: NEARLY EVERY DAY
6. BECOMING EASILY ANNOYED OR IRRITABLE: NEARLY EVERY DAY

## 2020-07-16 ASSESSMENT — PATIENT HEALTH QUESTIONNAIRE - PHQ9
5. POOR APPETITE OR OVEREATING: NEARLY EVERY DAY
SUM OF ALL RESPONSES TO PHQ QUESTIONS 1-9: 13

## 2020-07-16 ASSESSMENT — MIFFLIN-ST. JEOR: SCORE: 1659.48
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
## 2020-07-16 NOTE — PATIENT INSTRUCTIONS
1. Go down to the lab for blood and urine work.  2. Expect to hear about your lab results in the coming 1-2 weeks.  3. Follow up in 1 year.    Preventive Health Recommendations  Male Ages 26 - 39    Yearly exam:             See your health care provider every year in order to  o   Review health changes.   o   Discuss preventive care.    o   Review your medicines if your doctor has prescribed any.    You should be tested each year for STDs (sexually transmitted diseases), if you re at risk.     After age 35, talk to your provider about cholesterol testing. If you are at risk for heart disease, have your cholesterol tested at least every 5 years.     If you are at risk for diabetes, you should have a diabetes test (fasting glucose).  Shots: Get a flu shot each year. Get a tetanus shot every 10 years.     Nutrition:    Eat at least 5 servings of fruits and vegetables daily.     Eat whole-grain bread, whole-wheat pasta and brown rice instead of white grains and rice.     Get adequate Calcium and Vitamin D.     Lifestyle    Exercise for at least 150 minutes a week (30 minutes a day, 5 days a week). This will help you control your weight and prevent disease.     Limit alcohol to one drink per day.     No smoking.     Wear sunscreen to prevent skin cancer.     See your dentist every six months for an exam and cleaning.

## 2020-07-17 PROBLEM — F32.A DEPRESSION, UNSPECIFIED DEPRESSION TYPE: Status: ACTIVE | Noted: 2020-07-17

## 2020-07-17 PROBLEM — K90.41 GLUTEN INTOLERANCE: Status: ACTIVE | Noted: 2020-07-17

## 2020-07-17 LAB
C TRACH DNA SPEC QL NAA+PROBE: NEGATIVE
CHOLEST SERPL-MCNC: 179 MG/DL
GLUCOSE SERPL-MCNC: 79 MG/DL (ref 70–99)
HDLC SERPL-MCNC: 73 MG/DL
HIV 1+2 AB+HIV1 P24 AG SERPL QL IA: NONREACTIVE
IGA SERPL-MCNC: 239 MG/DL (ref 84–499)
LDLC SERPL CALC-MCNC: 81 MG/DL
N GONORRHOEA DNA SPEC QL NAA+PROBE: NEGATIVE
NONHDLC SERPL-MCNC: 106 MG/DL
SPECIMEN SOURCE: NORMAL
SPECIMEN SOURCE: NORMAL
T PALLIDUM AB SER QL: NONREACTIVE
TRIGL SERPL-MCNC: 123 MG/DL
TSH SERPL DL<=0.005 MIU/L-ACNC: 1.26 MU/L (ref 0.4–4)
TTG IGA SER-ACNC: <1 U/ML
TTG IGG SER-ACNC: <1 U/ML

## 2020-07-17 ASSESSMENT — ANXIETY QUESTIONNAIRES: GAD7 TOTAL SCORE: 19

## 2020-11-12 ENCOUNTER — OFFICE VISIT (OUTPATIENT)
Dept: FAMILY MEDICINE | Facility: CLINIC | Age: 40
End: 2020-11-12
Payer: COMMERCIAL

## 2020-11-12 VITALS
HEIGHT: 71 IN | TEMPERATURE: 97 F | HEART RATE: 78 BPM | DIASTOLIC BLOOD PRESSURE: 60 MMHG | OXYGEN SATURATION: 96 % | SYSTOLIC BLOOD PRESSURE: 116 MMHG | BODY MASS INDEX: 24.15 KG/M2 | WEIGHT: 172.5 LBS

## 2020-11-12 DIAGNOSIS — K92.1 BLOOD IN STOOL: ICD-10-CM

## 2020-11-12 DIAGNOSIS — K92.1 BLOOD IN STOOL: Primary | ICD-10-CM

## 2020-11-12 DIAGNOSIS — R12 HEARTBURN: ICD-10-CM

## 2020-11-12 DIAGNOSIS — K92.0 HEMATEMESIS WITH NAUSEA: ICD-10-CM

## 2020-11-12 LAB
ALBUMIN SERPL-MCNC: 3.8 G/DL (ref 3.4–5)
ALP SERPL-CCNC: 58 U/L (ref 40–150)
ALT SERPL W P-5'-P-CCNC: 53 U/L (ref 0–70)
ANION GAP SERPL CALCULATED.3IONS-SCNC: 2 MMOL/L (ref 3–14)
AST SERPL W P-5'-P-CCNC: 41 U/L (ref 0–45)
BASOPHILS # BLD AUTO: 0 10E9/L (ref 0–0.2)
BASOPHILS NFR BLD AUTO: 0.8 %
BILIRUB SERPL-MCNC: 0.5 MG/DL (ref 0.2–1.3)
BUN SERPL-MCNC: 14 MG/DL (ref 7–30)
CALCIUM SERPL-MCNC: 9.7 MG/DL (ref 8.5–10.1)
CHLORIDE SERPL-SCNC: 108 MMOL/L (ref 94–109)
CO2 SERPL-SCNC: 29 MMOL/L (ref 20–32)
COLLECT DATE SP2 STL: NORMAL
COLLECT DATE SP3 STL: NORMAL
COLLECT DATE STL: NORMAL
CREAT SERPL-MCNC: 1.1 MG/DL (ref 0.66–1.25)
CRP SERPL-MCNC: <2.9 MG/L (ref 0–8)
DIFFERENTIAL METHOD BLD: NORMAL
EOSINOPHIL # BLD AUTO: 0.1 10E9/L (ref 0–0.7)
EOSINOPHIL NFR BLD AUTO: 2.2 %
ERYTHROCYTE [DISTWIDTH] IN BLOOD BY AUTOMATED COUNT: 14.3 % (ref 10–15)
GFR SERPL CREATININE-BSD FRML MDRD: 84 ML/MIN/{1.73_M2}
GLUCOSE SERPL-MCNC: 91 MG/DL (ref 70–99)
HCT VFR BLD AUTO: 42.1 % (ref 40–53)
HEMOCCULT SP1 STL QL: NEGATIVE
HGB BLD-MCNC: 13.8 G/DL (ref 13.3–17.7)
LYMPHOCYTES # BLD AUTO: 1.8 10E9/L (ref 0.8–5.3)
LYMPHOCYTES NFR BLD AUTO: 35.2 %
MCH RBC QN AUTO: 29.2 PG (ref 26.5–33)
MCHC RBC AUTO-ENTMCNC: 32.8 G/DL (ref 31.5–36.5)
MCV RBC AUTO: 89 FL (ref 78–100)
MONOCYTES # BLD AUTO: 0.4 10E9/L (ref 0–1.3)
MONOCYTES NFR BLD AUTO: 8.5 %
NEUTROPHILS # BLD AUTO: 2.7 10E9/L (ref 1.6–8.3)
NEUTROPHILS NFR BLD AUTO: 53.3 %
PLATELET # BLD AUTO: 278 10E9/L (ref 150–450)
POTASSIUM SERPL-SCNC: 4.5 MMOL/L (ref 3.4–5.3)
PROT SERPL-MCNC: 7.3 G/DL (ref 6.8–8.8)
RBC # BLD AUTO: 4.72 10E12/L (ref 4.4–5.9)
SODIUM SERPL-SCNC: 139 MMOL/L (ref 133–144)
WBC # BLD AUTO: 5.1 10E9/L (ref 4–11)

## 2020-11-12 PROCEDURE — 82270 OCCULT BLOOD FECES: CPT | Performed by: FAMILY MEDICINE

## 2020-11-12 PROCEDURE — 87338 HPYLORI STOOL AG IA: CPT | Performed by: FAMILY MEDICINE

## 2020-11-12 PROCEDURE — 86140 C-REACTIVE PROTEIN: CPT | Performed by: FAMILY MEDICINE

## 2020-11-12 PROCEDURE — 36415 COLL VENOUS BLD VENIPUNCTURE: CPT | Performed by: FAMILY MEDICINE

## 2020-11-12 PROCEDURE — 80053 COMPREHEN METABOLIC PANEL: CPT | Performed by: FAMILY MEDICINE

## 2020-11-12 PROCEDURE — 85025 COMPLETE CBC W/AUTO DIFF WBC: CPT | Performed by: FAMILY MEDICINE

## 2020-11-12 PROCEDURE — 99214 OFFICE O/P EST MOD 30 MIN: CPT | Performed by: FAMILY MEDICINE

## 2020-11-12 RX ORDER — TRAZODONE HYDROCHLORIDE 50 MG/1
50 TABLET, FILM COATED ORAL PRN
COMMUNITY
Start: 2020-11-09 | End: 2021-11-11

## 2020-11-12 RX ORDER — PANTOPRAZOLE SODIUM 40 MG/1
40 TABLET, DELAYED RELEASE ORAL DAILY
Qty: 30 TABLET | Refills: 2 | Status: SHIPPED | OUTPATIENT
Start: 2020-11-12 | End: 2021-01-29

## 2020-11-12 RX ORDER — FAMOTIDINE 20 MG/1
20 TABLET, FILM COATED ORAL 2 TIMES DAILY PRN
Qty: 60 TABLET | Refills: 2 | Status: SHIPPED | OUTPATIENT
Start: 2020-11-12 | End: 2021-01-29

## 2020-11-12 ASSESSMENT — MIFFLIN-ST. JEOR: SCORE: 1711.83

## 2020-11-12 NOTE — PROGRESS NOTES
"Subjective     Jimmy López III is a 40 year old male who presents to clinic today for the following health issues:    HPI          Here with concern about blood in stool and vomit.  Reports history of gluten intolerance (no diagnosis of celiac disease).      Reports seeing blood in vomit and stool intermittently for past 10 years.      Has been trying to identify triggers.  Reports improvement in symptoms if he avoids gluten (TTG Ab) was negative this past summer but was avoiding gluten at that time.  Highly acidic foods seem to cause vomiting (for example, recently had vinaigrette dressing and vomited).  Has noticed improvement in symptoms with cutting back on drinking and smoking.  He has not had anything to drink in about a week (previously was drinking >6 drinks per night).  Was smoking 1ppd but quit on 11/5.  Last episode of vomiting was about a week ago and he did notice streaks of blood in his vomit then.  In past has noticed more coffee ground appearing emesis.  His last bloody BM was in October (bright red blood) but he has noticed intermittent black tarry stools as well.  Has been eating bland foods for past 10 days (since vomiting) but last night had chipotle with hot sauce and soda - resulted in increased abdominal pain, heartburn, and burping/flatus.     Colonoscopy 5 years ago at [facility].  Was told may be due to EtOH.       Mom has arthritis.  No family history of celiac disease, IBD, or other rheumatologic disorders that he is aware of.        Taking CBD probiotic.  Has not had improvement with Tums.  Has not tried PPI or H2 blocker.      Review of Systems   Constitutional, cardiovascular, pulmonary, GI, , and psych systems are negative, except as otherwise noted.      Objective    /60   Pulse 78   Temp 97  F (36.1  C) (Temporal)   Ht 1.791 m (5' 10.51\")   Wt 78.2 kg (172 lb 8 oz)   SpO2 96%   BMI 24.39 kg/m    Body mass index is 24.39 kg/m .     Physical Exam   GENERAL: healthy, alert " and no distress  EYES: Eyes grossly normal to inspection, conjunctivae and sclerae normal  NECK: no adenopathy, no asymmetry, or masses, and thyroid normal to palpation  RESP: lungs clear to auscultation - no rales, rhonchi or wheezes  CV: regular rate and rhythm, no murmur  ABDOMEN: soft, generalized tenderness without rebound/rigidity/guarding, no hepatosplenomegaly, no masses and bowel sounds normal  MS: no gross musculoskeletal defects noted, no edema  SKIN: warm and dry, no jaundice or pallor, no suspicious lesions or rashes  NEURO: Normal strength and tone, mentation intact and speech normal         Assessment & Plan     Blood in stool  Hematemesis with nausea  Heartburn  Symptoms are chronic and intermittent.  Reports both bright red blood in stools as well as melanotic stools based on his description and also blood in vomit as well as coffee ground like emesis.  Symptoms of dyspepsia as well.  No weight loss noted.  We discussed signs and symptoms of GI bleeding and potential etiologies.  Plan for labs as ordered and GI referral (anticipate he will need to have both EGD/colonscopy).  For symptomatic control I recommend he take Protonix daily and may also use famotidine for breakthrough symptoms (nausea, vomiting, abdominal pain, belching).  I have encouraged him to also focus on the lifestyle changes he has made including decrease his EtOH consumption, quitting smoking, and focusing on diet changes.    - Comprehensive metabolic panel (BMP + Alb, Alk Phos, ALT, AST, Total. Bili, TP)  - CBC with platelets and differential  - CRP, inflammation  - Helicobacter pylori Antigen Stool  - Occult blood stool  - GASTROENTEROLOGY ADULT REF CONSULT ONLY  - pantoprazole (PROTONIX) 40 MG EC tablet  Dispense: 30 tablet; Refill: 2  - famotidine (PEPCID) 20 MG tablet  Dispense: 60 tablet; Refill: 2    I have asked that he see his PCP for follow-up in 2-4 weeks, sooner if needed.  He expressed agreement and understanding with  plan as above.      Karma Quintana, [facility] [place]

## 2020-11-12 NOTE — PATIENT INSTRUCTIONS
1.  Take pantoprazole every day.  Best to take 30 minutes before a meal.  2.  Take famotidine twice daily as needed for heartburn, abdominal pain, belching.  3.  Labs today - I will send results via [email or web address].  4.  Drop off stool samples at lab when able.    5.  GI referral placed - you should be contacted by phone in 2 business days.    6.  Talk with your psychiatrist about your sertraline.      Please follow-up with your primary doctor in 2-4 weeks.

## 2020-11-12 NOTE — Clinical Note
Hi Dr Almeida and Dr Martinez,  I met Jimmy today to discuss history of blood in stools and vomit (ongoing for past 10 years).  Labs are pending.  I did refer him to GI.  Asked him to follow-up with you in 2-4 weeks.  Thanks!    Jerica

## 2020-11-13 LAB — H PYLORI AG STL QL IA: NEGATIVE

## 2020-11-17 NOTE — TELEPHONE ENCOUNTER
REFERRAL INFORMATION:    Referring Provider:  Dr. Piedad Quintana     Referring Clinic:  [facility]     Reason for Visit/Diagnosis: Blood in stool      FUTURE VISIT INFORMATION:    Appointment Date: 12/14/2020    Appointment Time: 11:20 AM      NOTES STATUS DETAILS   OFFICE NOTE from Referring Provider Internal 11/12/2020 Office visit with Dr. Quintana    OFFICE NOTE from Other Specialist N/A    HOSPITAL DISCHARGE SUMMARY/  ED VISITS N/A    OPERATIVE REPORT N/A    MEDICATION LIST Internal         ENDOSCOPY  In process    COLONOSCOPY Received  6/11/14 ([facility])   ERCP N/A    EUS N/A    STOOL TESTING Internal 11/12/2020   PERTINENT LABS Internal    PATHOLOGY REPORTS (RELATED) Received  6/11/14 ([facility])    IMAGING (CT, MRI, EGD, MRCP, Small Bowel Follow Through/SBT, MR/CT Enterography) N/A      11/17/2020 7:44am Fax request sent to [facility] ([phone]) for med recs. Megan     11/17/2020 9:12am Received recs from [facility]; sent to scan. Megan

## 2020-12-14 ENCOUNTER — VIRTUAL VISIT (OUTPATIENT)
Dept: GASTROENTEROLOGY | Facility: CLINIC | Age: 40
End: 2020-12-14
Payer: COMMERCIAL

## 2020-12-14 ENCOUNTER — PRE VISIT (OUTPATIENT)
Dept: GASTROENTEROLOGY | Facility: CLINIC | Age: 40
End: 2020-12-14

## 2020-12-14 VITALS — WEIGHT: 173 LBS | HEIGHT: 72 IN | BODY MASS INDEX: 23.43 KG/M2

## 2020-12-14 DIAGNOSIS — R12 HEARTBURN: ICD-10-CM

## 2020-12-14 DIAGNOSIS — K92.1 BLOOD IN STOOL: ICD-10-CM

## 2020-12-14 PROBLEM — H10.9 CONJUNCTIVITIS: Status: ACTIVE | Noted: 2020-12-14

## 2020-12-14 PROBLEM — F17.200 NICOTINE DEPENDENCE: Status: ACTIVE | Noted: 2020-12-14

## 2020-12-14 PROBLEM — M87.052 AVASCULAR NECROSIS OF HIP, LEFT (H): Status: ACTIVE | Noted: 2018-05-22

## 2020-12-14 PROBLEM — A56.8 SEXUALLY TRANSMITTED CHLAMYDIA TRACHOMATIS INFECTION: Status: ACTIVE | Noted: 2020-12-14

## 2020-12-14 PROBLEM — K52.9 GASTROENTERITIS: Status: ACTIVE | Noted: 2020-12-14

## 2020-12-14 PROBLEM — M54.50 LOWER BACK PAIN: Status: ACTIVE | Noted: 2020-12-14

## 2020-12-14 PROBLEM — M54.30 SCIATICA: Status: ACTIVE | Noted: 2020-12-14

## 2020-12-14 PROBLEM — J30.9 ALLERGIC RHINITIS: Status: ACTIVE | Noted: 2020-12-14

## 2020-12-14 PROCEDURE — 99204 OFFICE O/P NEW MOD 45 MIN: CPT | Mod: 95 | Performed by: INTERNAL MEDICINE

## 2020-12-14 RX ORDER — SERTRALINE HYDROCHLORIDE 100 MG/1
TABLET, FILM COATED ORAL
COMMUNITY
Start: 2020-12-08 | End: 2021-11-11

## 2020-12-14 ASSESSMENT — PAIN SCALES - GENERAL: PAINLEVEL: MILD PAIN (3)

## 2020-12-14 ASSESSMENT — MIFFLIN-ST. JEOR: SCORE: 1737.72

## 2020-12-14 NOTE — NURSING NOTE
Chief Complaint   Patient presents with     Consult     consult BRBPR, occasional melena, and anorectal pain, occasional N/V; hx celiac as of 7/2020       Vitals:    12/14/20 1053   Weight: 78.5 kg (173 lb)   Height: 1.829 m (6')       Body mass index is 23.46 kg/m .    Francis Ortiz, EMT

## 2020-12-14 NOTE — LETTER
"  12/14/2020       RE: Jimmy López III  [address]  [place] [address]      Dear Colleague,    Thank you for referring your patient, Jimmy López III, to the [facility] [place]. Please see a copy of my visit note below.    Jimmy López III is a 40 year old male who is being evaluated via a billable video visit.      The patient has been notified of following:     \"This video visit will be conducted via a call between you and your physician/provider. We have found that certain health care needs can be provided without the need for an in-person physical exam.  This service lets us provide the care you need with a video conversation.  If a prescription is necessary we can send it directly to your pharmacy.  If lab work is needed we can place an order for that and you can then stop by our lab to have the test done at a later time.    Video visits are billed at different rates depending on your insurance coverage.  Please reach out to your insurance provider with any questions.    If during the course of the call the physician/provider feels a video visit is not appropriate, you will not be charged for this service.\"    Patient has given verbal consent for Video visit? Yes  How would you like to obtain your AVS? MyChart  If you are dropped from the video visit, the video invite should be resent to: Text to cell phone: [phone]  Will anyone else be joining your video visit? No      Video-Visit Details    Type of service:  Video Visit    Video Start Time: 11:20  Video End Time: 11:45    Originating Location (pt. Location): Home    Distant Location (provider location):  [facility] [place]     Platform used for Video Visit: [facility]        Gastroenterology Consult   [facility]      Chief Complaint:     Bloody stool, Blood in Vomit         ASSESSMENT AND RECOMMENDATIONS:   Assessment:  40 year old male with hematemesis and " hematochezia, normal hemoglobin      Recommendations:  EGD and Colonoscopy  CT enterography             History of Present Illness:   Jimmy López III is a 40 year old male with a history of years about 10 of bloody occasionally diarrheal stools.  He says he had been vomiting too prior to stopping drinking quite as heavily and eating gluten.  He says since doing that he has decreased vomiting.  He still has bloody stools couple times a week and an occasional black school.  He was told he was eating too much tomato sauce.  He denies any other significant symptoms now other than neck and back pain.   He has not been formal diagnosed with celiac disease.  He has no personal or family history of celiac disease. He has continued to drink and smoke.  He is trying to cut down.  He has no known liver disease.  He does not have elevated liver enzymes.              Past Medical History:     Past Medical History:   Diagnosis Date     Celiac disease 07/2020     Sciatica 5/2014            Past Surgical History:     No past surgical history on file.         Previous Endoscopy:   No results found for this or any previous visit.         Social History:     Social History     Socioeconomic History     Marital status:      Spouse name: None     Number of children: None     Years of education: None     Highest education level: None   Occupational History     None   Social Needs     Financial resource strain: None     Food insecurity     Worry: None     Inability: None     Transportation needs     Medical: None     Non-medical: None   Tobacco Use     Smoking status: Current Every Day Smoker     Packs/day: 0.25     Types: Cigarettes     Smokeless tobacco: Former User     Types: Chew     Tobacco comment: using patch, down to 5-6cigs /day as of 12/2020   Substance and Sexual Activity     Alcohol use: Yes     Drug use: Not Currently     Sexual activity: Yes     Partners: Female     Birth control/protection: Patch   Lifestyle      Physical activity     Days per week: None     Minutes per session: None     Stress: None   Relationships     Social connections     Talks on phone: None     Gets together: None     Attends Anabaptism service: None     Active member of club or organization: None     Attends meetings of clubs or organizations: None     Relationship status: None     Intimate partner violence     Fear of current or ex partner: None     Emotionally abused: None     Physically abused: None     Forced sexual activity: None   Other Topics Concern     None   Social History Narrative     None            Family History:     Family History   Problem Relation Age of Onset     Deep Vein Thrombosis (DVT) Mother      Deep Vein Thrombosis Mother      Crohn's Disease Cousin      Colon Cancer No family hx of      Irritable Bowel Syndrome No family hx of      No known history of colorectal cancer, liver disease, or inflammatory bowel disease.         Allergies:   Reviewed and edited as appropriate     Allergies   Allergen Reactions     Varenicline      katie that persisted on this     Gluten Meal Nausea and Vomiting and Diarrhea     Patient states he feels like he is going to die , sometimes has blood in the vomit .     Pollen Extract Difficulty breathing     hayfever            Medications:     Current Outpatient Medications   Medication Sig Dispense Refill     famotidine (PEPCID) 20 MG tablet Take 1 tablet (20 mg) by mouth 2 times daily as needed (heartburn) 60 tablet 2     HYDROXYZINE PAMOATE PO Take 25 mg by mouth every 4 hours as needed for itching       nicotine (NICODERM CQ) 14 MG/24HR 24 hr patch Place 1 patch onto the skin every 24 hours 14 patch 0     nicotine (NICODERM CQ) 21 MG/24HR 24 hr patch Place 1 patch onto the skin every 24 hours 42 patch 0     nicotine (NICODERM CQ) 7 MG/24HR 24 hr patch Place 1 patch onto the skin every 24 hours 14 patch 0     pantoprazole (PROTONIX) 40 MG EC tablet Take 1 tablet (40 mg) by mouth daily 30  tablet 2     sertraline (ZOLOFT) 100 MG tablet        traZODone (DESYREL) 50 MG tablet Take 50 mg by mouth as needed               Review of Systems:     A complete 10 point review of systems was performed and is negative except as noted in the HPI           Physical Exam:   Ht 1.829 m (6')   Wt 78.5 kg (173 lb)   BMI 23.46 kg/m    Wt:   Wt Readings from Last 2 Encounters:   12/14/20 78.5 kg (173 lb)   11/12/20 78.2 kg (172 lb 8 oz)      Constitutional: cooperative, pleasant, not dyspneic/diaphoretic, no acute distress  Eyes: Sclera anicteric/injected  Respiratory: Unlabored breathing  Skin: warm, perfused, no jaundice  Neuro: AAO x 3, No asterixis  Psych: Normal affect  MSK: No gross deformities         Data:       Norris Stewart MD  Associate Professor of Medicine  Division of Gastroenterology, Hepatology, and Nutrition  [facility]

## 2020-12-14 NOTE — PROGRESS NOTES
"Jimmy López III is a 40 year old male who is being evaluated via a billable video visit.      The patient has been notified of following:     \"This video visit will be conducted via a call between you and your physician/provider. We have found that certain health care needs can be provided without the need for an in-person physical exam.  This service lets us provide the care you need with a video conversation.  If a prescription is necessary we can send it directly to your pharmacy.  If lab work is needed we can place an order for that and you can then stop by our lab to have the test done at a later time.    Video visits are billed at different rates depending on your insurance coverage.  Please reach out to your insurance provider with any questions.    If during the course of the call the physician/provider feels a video visit is not appropriate, you will not be charged for this service.\"    Patient has given verbal consent for Video visit? Yes  How would you like to obtain your AVS? MyChart  If you are dropped from the video visit, the video invite should be resent to: Text to cell phone: [phone]  Will anyone else be joining your video visit? No        Video-Visit Details    Type of service:  Video Visit    Video Start Time: 11:20  Video End Time: 11:45    Originating Location (pt. Location): Home    Distant Location (provider location):  [facility] [place]     Platform used for Video Visit: Boo Stewart       Gastroenterology Consult   [facility]            Chief Complaint:     Bloody stool, Blood in Vomit         ASSESSMENT AND RECOMMENDATIONS:   Assessment:  40 year old male with hematemesis and hematochezia, normal hemoglobin      Recommendations:  EGD and Colonoscopy  CT enterography             History of Present Illness:   iJmmy López III is a 40 year old male with a history of years about 10 of bloody occasionally diarrheal stools.  He says he had " been vomiting too prior to stopping drinking quite as heavily and eating gluten.  He says since doing that he has decreased vomiting.  He still has bloody stools couple times a week and an occasional black school.  He was told he was eating too much tomato sauce.  He denies any other significant symptoms now other than neck and back pain.   He has not been formal diagnosed with celiac disease.  He has no personal or family history of celiac disease. He has continued to drink and smoke.  He is trying to cut down.  He has no known liver disease.  He does not have elevated liver enzymes.              Past Medical History:     Past Medical History:   Diagnosis Date     Celiac disease 07/2020     Sciatica 5/2014            Past Surgical History:     No past surgical history on file.         Previous Endoscopy:   No results found for this or any previous visit.         Social History:     Social History     Socioeconomic History     Marital status:      Spouse name: None     Number of children: None     Years of education: None     Highest education level: None   Occupational History     None   Social Needs     Financial resource strain: None     Food insecurity     Worry: None     Inability: None     Transportation needs     Medical: None     Non-medical: None   Tobacco Use     Smoking status: Current Every Day Smoker     Packs/day: 0.25     Types: Cigarettes     Smokeless tobacco: Former User     Types: Chew     Tobacco comment: using patch, down to 5-6cigs /day as of 12/2020   Substance and Sexual Activity     Alcohol use: Yes     Drug use: Not Currently     Sexual activity: Yes     Partners: Female     Birth control/protection: Patch   Lifestyle     Physical activity     Days per week: None     Minutes per session: None     Stress: None   Relationships     Social connections     Talks on phone: None     Gets together: None     Attends Mu-ism service: None     Active member of club or organization: None      Attends meetings of clubs or organizations: None     Relationship status: None     Intimate partner violence     Fear of current or ex partner: None     Emotionally abused: None     Physically abused: None     Forced sexual activity: None   Other Topics Concern     None   Social History Narrative     None            Family History:     Family History   Problem Relation Age of Onset     Deep Vein Thrombosis (DVT) Mother      Deep Vein Thrombosis Mother      Crohn's Disease Cousin      Colon Cancer No family hx of      Irritable Bowel Syndrome No family hx of      No known history of colorectal cancer, liver disease, or inflammatory bowel disease.         Allergies:   Reviewed and edited as appropriate     Allergies   Allergen Reactions     Varenicline      katie that persisted on this     Gluten Meal Nausea and Vomiting and Diarrhea     Patient states he feels like he is going to die , sometimes has blood in the vomit .     Pollen Extract Difficulty breathing     hayfever            Medications:     Current Outpatient Medications   Medication Sig Dispense Refill     famotidine (PEPCID) 20 MG tablet Take 1 tablet (20 mg) by mouth 2 times daily as needed (heartburn) 60 tablet 2     HYDROXYZINE PAMOATE PO Take 25 mg by mouth every 4 hours as needed for itching       nicotine (NICODERM CQ) 14 MG/24HR 24 hr patch Place 1 patch onto the skin every 24 hours 14 patch 0     nicotine (NICODERM CQ) 21 MG/24HR 24 hr patch Place 1 patch onto the skin every 24 hours 42 patch 0     nicotine (NICODERM CQ) 7 MG/24HR 24 hr patch Place 1 patch onto the skin every 24 hours 14 patch 0     pantoprazole (PROTONIX) 40 MG EC tablet Take 1 tablet (40 mg) by mouth daily 30 tablet 2     sertraline (ZOLOFT) 100 MG tablet        traZODone (DESYREL) 50 MG tablet Take 50 mg by mouth as needed               Review of Systems:     A complete 10 point review of systems was performed and is negative except as noted in the HPI           Physical  Exam:   Ht 1.829 m (6')   Wt 78.5 kg (173 lb)   BMI 23.46 kg/m    Wt:   Wt Readings from Last 2 Encounters:   12/14/20 78.5 kg (173 lb)   11/12/20 78.2 kg (172 lb 8 oz)      Constitutional: cooperative, pleasant, not dyspneic/diaphoretic, no acute distress  Eyes: Sclera anicteric/injected  Respiratory: Unlabored breathing  Skin: warm, perfused, no jaundice  Neuro: AAO x 3, No asterixis  Psych: Normal affect  MSK: No gross deformities         Data:         Norris Stewart MD  Associate Professor of Medicine  Division of Gastroenterology, Hepatology, and Nutrition  [facility]

## 2020-12-18 ENCOUNTER — TELEPHONE (OUTPATIENT)
Dept: GASTROENTEROLOGY | Facility: OUTPATIENT CENTER | Age: 40
End: 2020-12-18

## 2020-12-18 DIAGNOSIS — Z11.59 ENCOUNTER FOR SCREENING FOR OTHER VIRAL DISEASES: Primary | ICD-10-CM

## 2020-12-20 ENCOUNTER — HEALTH MAINTENANCE LETTER (OUTPATIENT)
Age: 40
End: 2020-12-20

## 2020-12-29 ENCOUNTER — OFFICE VISIT (OUTPATIENT)
Dept: FAMILY MEDICINE | Facility: CLINIC | Age: 40
End: 2020-12-29
Payer: COMMERCIAL

## 2020-12-29 VITALS
TEMPERATURE: 97.7 F | HEART RATE: 79 BPM | DIASTOLIC BLOOD PRESSURE: 82 MMHG | BODY MASS INDEX: 23.84 KG/M2 | OXYGEN SATURATION: 99 % | SYSTOLIC BLOOD PRESSURE: 114 MMHG | HEIGHT: 72 IN | WEIGHT: 176 LBS

## 2020-12-29 DIAGNOSIS — M54.16 LUMBAR RADICULOPATHY: Primary | ICD-10-CM

## 2020-12-29 PROCEDURE — 99215 OFFICE O/P EST HI 40 MIN: CPT | Performed by: NURSE PRACTITIONER

## 2020-12-29 RX ORDER — GABAPENTIN 300 MG/1
300 CAPSULE ORAL 3 TIMES DAILY
Qty: 60 CAPSULE | Refills: 1 | Status: SHIPPED | OUTPATIENT
Start: 2020-12-29 | End: 2021-03-15

## 2020-12-29 RX ORDER — CYCLOBENZAPRINE HCL 5 MG
5 TABLET ORAL 2 TIMES DAILY PRN
Qty: 30 TABLET | Refills: 3 | Status: SHIPPED | OUTPATIENT
Start: 2020-12-29 | End: 2021-11-11

## 2020-12-29 RX ORDER — GABAPENTIN 300 MG/1
300 CAPSULE ORAL 3 TIMES DAILY
Qty: 60 CAPSULE | Refills: 1 | Status: SHIPPED | OUTPATIENT
Start: 2020-12-29 | End: 2020-12-29

## 2020-12-29 ASSESSMENT — MIFFLIN-ST. JEOR: SCORE: 1751.33

## 2020-12-29 NOTE — PATIENT INSTRUCTIONS
MRI ordered.    Epidural injection orders place, you should be contacted.    Avoid Ibuprofen/aleve or naproxen as they can thin your blood.    Can take extra strength tylenol up to 3000 mg per day.    Start Gabapentin: 300 mg once daily for three days, 300 mg twice daily for three days then 300 mg 3x per day.    [facility] referral for Physical Therapy/chiropractic.     Spine surgery referral to be considered.

## 2020-12-29 NOTE — PROGRESS NOTES
Subjective     Jimmy López III is a 40 year old male who presents to clinic today for the following health issues:    HPI         Back Pain/Sciatica   Onset/Duration: 7 days  Description:   Location of pain: low back left, gluteus left into back of left leg thigh and calf, numbness radiating down to the foot. Denies injury or trauma.  Came on suddenly,  Can feel his big toe but other three toes numb, electric sensation  Tried ibuprofen, no relief  Character of pain: fullness and constant  Pain radiation: radiates into the left buttocks  New numbness or weakness in legs, not attributed to pain: YES  Intensity: Currently 9/10  Progression of Symptoms: worsening  History:   Specific cause: none  Pain interferes with job: YES  History of back problems: previous herniated disc and Left Hip replacement (history of AVN)  Any previous MRI or X-rays: Yes- at [facility].  Date 2018  Sees a specialist for back pain: No  Alleviating factors:   Improved by: steroid injection  - injection lasted about three months  Physical Therapy helped in the past   Going to try to start doing it on his own     Precipitating factors:  Worsened by: Lifting, Bending, Standing, Sitting and Walking  Therapies tried and outcome: cold and stretch    Accompanying Signs & Symptoms:  Risk of Fracture: None  Risk of Cauda Equina: None  Risk of Infection: None  Risk of Cancer: None  Risk of Ankylosing Spondylitis: Onset at age <35, male, AND morning back stiffness  no   -no incontinence               Review of Systems   Constitutional, HEENT, cardiovascular, pulmonary, GI, , musculoskeletal, neuro, skin, endocrine and psych systems are negative, except as otherwise noted.      Objective    /82   Pulse 79   Temp 97.7  F (36.5  C) (Oral)   Ht 1.829 m (6')   Wt 79.8 kg (176 lb)   SpO2 99%   BMI 23.87 kg/m    Body mass index is 23.87 kg/m .  Physical Exam   GENERAL: healthy, alert and no distress  EYES: Eyes grossly normal to inspection,  PERRL and conjunctivae and sclerae normal  HENT: ear canals and TM's normal, nose and mouth without ulcers or lesions  NECK: no adenopathy, no asymmetry, masses, or scars and thyroid normal to palpation  RESP: lungs clear to auscultation - no rales, rhonchi or wheezes  CV: regular rate and rhythm, normal S1 S2, no S3 or S4, no murmur, click or rub, no peripheral edema and peripheral pulses strong  ABDOMEN: soft, nontender, no hepatosplenomegaly, no masses and bowel sounds normal  MS: tenderness to palpation Lt lumbar spine and spine exam shows ROM is normal and positive left straight leg raise  SKIN: no suspicious lesions or rashes  NEURO: Normal strength and tone, mentation intact and speech normal  PSYCH: mentation appears normal, affect normal/bright    No results found for this or any previous visit (from the past 24 hour(s)).        Assessment & Plan     Lumbar radiculopathy  Patient reported a remote history of disc herniation.   An MRI was performed showing Superiorly migrating left subarticular extrusion at L5-S1  compresses on the descending cauda equina nerve roots on the left. All  of the descending nerves are compressed by the extrusion. Mild to  moderate bilateral neural foraminal stenosis at L5-S1.    -I paged the Orthopedic resident on call with MRI results due to concern for possible cauda equina syndrome. Due to no alarm symptoms or acute trauma/injury, ok to follow up with Spine surgery as an outpatient. Spine Surgery referral placed. Called patient and gave him the information and discussed results. He will schedule with Spine Surgery.  -If alarm symptoms develop or severely worsens patient needs to be seen in ER.    -Medrol dose pack ordered.  -should avoid Nsaids due to recent GI bleeding symptoms.  -Discussed extra strength APAP dosing and limits.  -will start Gabapentin and taper to 300 mg po bid - taper instructions reviewed in detail.  -Physical Therapy/chiropractic referral placed.    -Flexeril added 5 mg tid prn spasm/pain  -COLT ordered.    - MR Lumbar Spine w/o Contrast; Future  - [facility] HAND, AND CHIROPRACTIC REFERRAL; Future  - PAIN MANAGEMENT REFERRAL; Future  - cyclobenzaprine (FLEXERIL) 5 MG tablet; Take 1 tablet (5 mg) by mouth 2 times daily as needed for muscle spasms  - gabapentin (NEURONTIN) 300 MG capsule; Take 1 capsule (300 mg) by mouth 3 times daily Start with 300 mg once daily for 3 days - then increase to 300 mg twice daily for three days, then 300 mg three times daily.           Patient Instructions   MRI ordered.    Epidural injection orders place, you should be contacted.    Avoid Ibuprofen/aleve or naproxen as they can thin your blood.    Can take extra strength tylenol up to 3000 mg per day.    Start Gabapentin: 300 mg once daily for three days, 300 mg twice daily for three days then 300 mg 3x per day.    [facility] referral for Physical Therapy/chiropractic.     Spine surgery referral to be considered.              Return in about 4 weeks (around 1/26/2021) for Routine Visit.    ABDOUL Villanueva [facility]

## 2020-12-30 ENCOUNTER — THERAPY VISIT (OUTPATIENT)
Dept: PHYSICAL THERAPY | Facility: CLINIC | Age: 40
End: 2020-12-30
Attending: NURSE PRACTITIONER
Payer: COMMERCIAL

## 2020-12-30 DIAGNOSIS — M54.16 LUMBAR RADICULOPATHY: ICD-10-CM

## 2020-12-30 PROCEDURE — 97530 THERAPEUTIC ACTIVITIES: CPT | Mod: GP | Performed by: PHYSICAL THERAPIST

## 2020-12-30 PROCEDURE — 97161 PT EVAL LOW COMPLEX 20 MIN: CPT | Mod: GP | Performed by: PHYSICAL THERAPIST

## 2020-12-30 PROCEDURE — 97110 THERAPEUTIC EXERCISES: CPT | Mod: GP | Performed by: PHYSICAL THERAPIST

## 2020-12-30 NOTE — PROGRESS NOTES
[facility] Initial Evaluation  Subjective:  The history is provided by the patient. No  was used.   Therapist Generated HPI Evaluation  Problem details: 12/23/20. Pt started having low back and L LE pain on 12/23/20. Denies specific ARMIDA. Pt notes onset of L LE numbness on same day. He reports history of lumbar disc herniation problems over past 8 years which has always affected his L side..         Type of problem:  Lumbar.    This is a recurrent condition.  Condition occurred with:  Insidious onset.  Where condition occurred: at home.  Patient reports pain:  Lumbar spine left.  Pain is described as other (pressure, throbbing) and is constant.  Pain radiates to:  Gluteals left, thigh left, lower leg left and foot left. Pain is worse in the A.M..  Since onset symptoms are gradually worsening.  Associated symptoms:  Numbness and loss of strength. Symptoms are exacerbated by sitting, bending, twisting, standing and lifting  and relieved by other (changing positions).      Restrictions due to condition include:  Working in normal job without restrictions.  Barriers include:  None as reported by patient.    Patient Health History  Jimmy López III being seen for PT for back/discs.          Pain is reported as 9/10 on pain scale.  General health as reported by patient is good.  Pertinent medical history includes: smoking.   Red flags:  Pain at rest/night and significant weakness (consistent with current low back problem).  Medical allergies: none.   Surgeries include:  Orthopedic surgery. Other surgery history details: L ADRI Dec. 2018.    Current medications:  Anti-depressants, anti-inflammatory and muscle relaxants.    Current occupation is auditer.   Primary job tasks include:  Computer work, lifting/carrying, driving, prolonged sitting and prolonged standing.                                    Objective:  Standing Alignment:    Cervical/Thoracic:  Forward head  Shoulder/UE:   Rounded shoulders  Lumbar:  Lordosis decr            Gait:    Gait Type:  Antalgic   Assistive Devices:  None  Deviations:  Lumbar:  Trunk lean R and trunk flexionAnkle:  Push off decr L               Lumbar/SI Evaluation  ROM:    AROM Lumbar:   Flexion:          Max loss + L low back to thigh  Ext:                    Max loss + L low back   Side Bend:        Left:     Right:   Rotation:           Left:     Right:   Side Glide:        Left:  Min loss + L low back    Right:  Mod loss + L low back to thigh          Lumbar Myotomes:    T12-L3 (Hip Flex):  Left: 3+    Right: 5  L2-4 (Quads):  Left:  3+    Right:  5  L4 (Ankle DF):  Left:  4    Right:  5  L5 (Great Toe Ext): Left: 4+    Right: 5   S1 (Toe Raise):  Left: 2+    Right: 5  Lumbar DTR's:    L4 (Quad):  Left:  2   Right:  1  S1 (Achilles):  Left:  0   Right:  2    Lumbar Dermtomes:        L2 Left:  Hypo-light touch     L2 Right:  Normal-light touch  L3 Left:  Normal-light touch     L3 Right:  Normal-light touch  L4 Left:  Normal-light touch       L4 Right:  Normal-light touch  L5 Left:  Hyper-light touch     L5 Right:  Normal-light touch  S1 Left:  Hyper-light touch     S1 Right:  Normal-light touch  Neural Tension/Mobility:    Left side:  Slump positive.     Right side:   Slump  negative.                                                          [facility] Lumbar Evaluation    Posture:  Sitting: poor  Standing: fair  Lordosis: Reduced  Lateral Shift: nil  Correction of Posture: better      Test Movements:  FIS: During: peripheralizing  After: worse      EIS: During: increases  After: no worse    Repeat EIS: During: increases  After: no worse  Mechanical Response: no effect        SGIS L: During: increases  After: no worse    Repeat SGIS L: During: increases  After: no worse  Mechanical Response: IncROM    Conclusion: derangement  Principle of Treatment:  Posture Correction: posture correction      Lateral: rep SGIS L                                          ROS    Assessment/Plan:    Patient is a 40 year old male with lumbar complaints.    Patient has the following significant findings with corresponding treatment plan.                Diagnosis 1:  Lumbar radiculopathy  Pain -  hot/cold therapy, manual therapy, self management, education, directional preference exercise and home program  Decreased ROM/flexibility - manual therapy, therapeutic exercise and home program  Decreased strength - therapeutic exercise, therapeutic activities and home program  Decreased proprioception - neuro re-education, therapeutic activities and home program  Impaired gait - gait training and home program  Impaired muscle performance - neuro re-education and home program  Decreased function - therapeutic activities and home program  Impaired posture - neuro re-education and home program    Therapy Evaluation Codes:   1) History comprised of:   Personal factors that impact the plan of care:      None.    Comorbidity factors that impact the plan of care are:      Numbness/tingling, Pain at night/rest, Smoking and Weakness.     Medications impacting care: Anti-depressant, Anti-inflammatory and Muscle relaxant.  2) Examination of Body Systems comprised of:   Body structures and functions that impact the plan of care:      Lumbar spine.   Activity limitations that impact the plan of care are:      Bathing, Bending, Cooking, Driving, Dressing, Lifting, Reading/Computer work, Sitting, Standing, Walking, Working and Sleeping.  3) Clinical presentation characteristics are:   Evolving/Changing.  4) Decision-Making    Moderate complexity using standardized patient assessment instrument and/or measureable assessment of functional outcome.  Cumulative Therapy Evaluation is: Moderate complexity.    Previous and current functional limitations:  (See Goal Flow Sheet for this information)    Short term and Long term goals: (See Goal Flow Sheet for this information)     Communication ability:  Patient  appears to be able to clearly communicate and understand verbal and written communication and follow directions correctly.  Treatment Explanation - The following has been discussed with the patient:   RX ordered/plan of care  Anticipated outcomes  Possible risks and side effects  This patient would benefit from PT intervention to resume normal activities.   Rehab potential is good.    Frequency:  1 X week, once daily  Duration:  for 6 weeks tapering to 1 X every other week over 4 weeks  Discharge Plan:  Achieve all LTG.  Independent in home treatment program.  Reach maximal therapeutic benefit.    Please refer to the daily flowsheet for treatment today, total treatment time and time spent performing 1:1 timed codes.

## 2020-12-31 ENCOUNTER — ANCILLARY PROCEDURE (OUTPATIENT)
Dept: MRI IMAGING | Facility: CLINIC | Age: 40
End: 2020-12-31
Attending: NURSE PRACTITIONER
Payer: COMMERCIAL

## 2020-12-31 DIAGNOSIS — M54.16 LUMBAR RADICULOPATHY: ICD-10-CM

## 2020-12-31 PROCEDURE — 72148 MRI LUMBAR SPINE W/O DYE: CPT | Mod: GC | Performed by: RADIOLOGY

## 2020-12-31 RX ORDER — METHYLPREDNISOLONE 4 MG
TABLET, DOSE PACK ORAL
Qty: 21 TABLET | Refills: 0 | Status: ON HOLD | OUTPATIENT
Start: 2020-12-31 | End: 2021-06-29

## 2021-01-04 ENCOUNTER — THERAPY VISIT (OUTPATIENT)
Dept: PHYSICAL THERAPY | Facility: CLINIC | Age: 41
End: 2021-01-04
Payer: COMMERCIAL

## 2021-01-04 ENCOUNTER — TELEPHONE (OUTPATIENT)
Dept: GASTROENTEROLOGY | Facility: CLINIC | Age: 41
End: 2021-01-04

## 2021-01-04 ENCOUNTER — TELEPHONE (OUTPATIENT)
Dept: PEDIATRICS | Facility: CLINIC | Age: 41
End: 2021-01-04

## 2021-01-04 ENCOUNTER — TELEPHONE (OUTPATIENT)
Dept: NEUROSURGERY | Facility: CLINIC | Age: 41
End: 2021-01-04

## 2021-01-04 ENCOUNTER — MYC MEDICAL ADVICE (OUTPATIENT)
Dept: FAMILY MEDICINE | Facility: CLINIC | Age: 41
End: 2021-01-04

## 2021-01-04 DIAGNOSIS — M54.16 LUMBAR RADICULOPATHY: ICD-10-CM

## 2021-01-04 PROCEDURE — 97110 THERAPEUTIC EXERCISES: CPT | Mod: GP | Performed by: PHYSICAL THERAPIST

## 2021-01-04 PROCEDURE — 97530 THERAPEUTIC ACTIVITIES: CPT | Mod: GP | Performed by: PHYSICAL THERAPIST

## 2021-01-04 NOTE — TELEPHONE ENCOUNTER
Notified Pt of notes below per Shanita WISE       Future Appointments   Date Time Provider Department Center   1/5/2021  1:30 PM Shanita Card, APRN CNP [facility]         Berna Whatley RN   [facility]

## 2021-01-04 NOTE — TELEPHONE ENCOUNTER
Patient called to cancel Colonoscopy & EGD with Dr. Jerez at [facility] due to potential back surgery. Will call back to reschedule procedures when ready.

## 2021-01-04 NOTE — TELEPHONE ENCOUNTER
[facility] Call Center    Phone Message    May a detailed message be left on voicemail: yes     Reason for Call: Appointment Intake    Referring Provider Name: Shanita Card APRN CNP in  PRIMARY CARE  Diagnosis and/or Symptoms: Lumbar radiculopathy [M54.16]    Patients girlfriend Mindi calling to check status of scheduling surgery with someone in Neurosurgery clinic. States that they discussed doing emergency surgery. States that patient had an MRI done and they discussed doing surgery after that.    States that patient has a colonoscopy and endoscopy appt scheduled on Wednesday 1/6/21 and states they are wondering if they should cancel those if surgery is going to happen. States she is worried about patient being put out for these procedures.     Please advise and call Mindi back at your earliest convenience.     Action Taken: Other: [facility] NEUROSURGERY     Travel Screening: Not Applicable

## 2021-01-04 NOTE — TELEPHONE ENCOUNTER
Mindi (significant other- emergency contact) calls,    Patient also sent [employer] message, and called neurosurgery, Had MRI on 12/31, patient was called regarding emergency surgery, talked to spine surgery today and MRI results had not been sent over, wondering if these results can be sent to surgery center, also patient has  colonoscopy and endoscopy scheduled this week on Wednesday and wondering if these should be rescheduled. Routed as high priority, to inform patient status regarding scheduled procedures.    Patient also states Medrol dose pack started has not relieved any symptoms, patient states condition has not improved.    Andrey Haider RN

## 2021-01-04 NOTE — TELEPHONE ENCOUNTER
I spoke to this patient on Thursday, 12/31, and called Orthopedics regarding his MRI results. They advised ok to follow up in clinic this week unless symptoms severely worsen, in which case he needs to be seen in ER.    OK to postpone colonoscopy and endoscopy at this time unless bleeding worsens.      His MRI is in epic so should be viewable to Neurosurgery, as they are in the [facility] system.     Shanita Card CNP

## 2021-01-05 ENCOUNTER — VIRTUAL VISIT (OUTPATIENT)
Dept: FAMILY MEDICINE | Facility: CLINIC | Age: 41
End: 2021-01-05
Payer: COMMERCIAL

## 2021-01-05 DIAGNOSIS — M51.369 BULGING LUMBAR DISC: ICD-10-CM

## 2021-01-05 DIAGNOSIS — M54.16 LUMBAR RADICULOPATHY: Primary | ICD-10-CM

## 2021-01-05 PROCEDURE — 99417 PROLNG OP E/M EACH 15 MIN: CPT | Performed by: NURSE PRACTITIONER

## 2021-01-05 PROCEDURE — 99215 OFFICE O/P EST HI 40 MIN: CPT | Mod: 95 | Performed by: NURSE PRACTITIONER

## 2021-01-05 RX ORDER — TRAMADOL HYDROCHLORIDE 50 MG/1
50 TABLET ORAL
Qty: 7 TABLET | Refills: 0 | Status: CANCELLED | OUTPATIENT
Start: 2021-01-05 | End: 2021-01-12

## 2021-01-05 NOTE — TELEPHONE ENCOUNTER
SPINE PATIENTS - NEW PROTOCOL PREVISIT    RECORDS RECEIVED FROM: Internal   Date of Appt: 1/7/21   NOTES (FOR ALL VISITS) STATUS DETAILS   OFFICE NOTE from referring provider Internal Shanita Card NP @ [facility] (PCP):  12/29/20   OFFICE NOTE from other specialist N/A    DISCHARGE SUMMARY from hospital N/A    DISCHARGE REPORT from ER N/A    EMG REPORT N/A    MEDICATION LIST Internal    IMAGING  (FOR ALL VISITS)     MRI (HEAD, NECK, SPINE) Internal [facility]:  MRI Lumbar Spine 12/31/20   XRAY (SPINE) *NEUROSURGERY* N/A    CT (HEAD, NECK, SPINE) N/A

## 2021-01-05 NOTE — PROGRESS NOTES
Jimmy López III is a 40 year old male who is being evaluated via a billable video visit.      How would you like to obtain your AVS? MyChart  If the video visit is dropped, the invitation should be resent by: Text to cell phone: [phone]   Will anyone else be joining your video visit? No    Video Start Time: 1:30 pm  Assessment & Plan     Lumbar radiculopathy  Bulging lumbar disc  -Patient seen by me recently on December 29 with concerns for worsening lumbar back pain radiating down his left leg.  An MRI was performed on December 31 with findings below.  He has subsequently been advised to see neurosurgery and plans to follow-up with them on January 7.  He continues to deny any alarm symptoms.  He is aware that if he begins to develop any bowel or bladder incontinence or saddle anesthesia he needs to report to the emergency room immediately.  He started physical therapy briefly, but I advised him to discontinue that until further recommendations are received from neurosurgery.  He will continue with his Medrol Dosepak, gabapentin, and Flexeril.  He should avoid NSAIDs due to concerns for GI bleeding in recent months.  I will order a short course of tramadol, 50 mg at bedtime if needed for severe pain to help with his sleep.  That was called into his pharmacy by me.  In regards to his GI bleeding and planned panendoscopy, I advised that that may be postponed at this time given possible need for acute surgical intervention.  I also discussed this with my medical lead who agreed with my recommendation.  I advised the patient to call us or be seen should the bleeding resume.      Review of the result(s) of each unique test - Lumbar MRI    Discussion of management or test interpretation with external physician/other qualified healthcare professional/appropriate source - Orthopedic resident on call      62 minutes spent on the date of the encounter doing chart review, review of test results, interpretation of  tests, patient visit, documentation, discussion with family and calling medication into pharmacy          Tobacco Cessation:   reports that he has been smoking cigarettes. He has been smoking about 0.25 packs per day. He has quit using smokeless tobacco.  His smokeless tobacco use included chew.  Tobacco Cessation Action Plan: Information offered: Patient not interested at this time      Patient Instructions   Check with Neurosurgery on Physical Therapy recommendations.    Start Tramadol at bedtime if needed.    Continue other medications.    If bleeding resumes let us know.       No follow-ups on file.    ABDOUL Villanueva [facility]     Jimmy López III is a 40 year old who presents to clinic today for the following health issues  accompanied by his partner:    HPI     Patient presents today via video visit accompanied by his girlfriend.  He was seen by me last week December 29 reporting a 7-day course of worsening chronic low back pain.  See history below.  At that time, an MRI was ordered and performed on December 31.  This revealed superiorly migrating left subarticular extrusion at L5-S1 compressing on the descending cauda equina nerve roots on the left.  All of the descending nerve roots compressed by the extrusion.  Mild to moderate bilateral neural foraminal stenosis at L5-S1 no significant spinal canal or neural foraminal stenosis in the remainder of the lumbar spine.  Orthopedics were consulted by phone at that time and did not advise up on emergency surgery, as patient reported no acute injury nor alarm symptoms of saddle anesthesia or bowel/bladder incontinence.  However.  He has been advised to follow-up with neurosurgery to discuss surgical options, and will see them on January 7.    Patient recently started physical therapy and has done 2 sessions.  However, he inquires as to whether he should continue with PT given his upcoming neurosurgery  appointment.  He reports that symptoms have been constant but not worsening.  He started a Medrol Dosepak in recent days with slight relief.  He continues to take gabapentin, 300 mg 3 times daily, as well as Flexeril, 5 mg twice daily if needed.  He reports very little relief from these.  Notes that the pain is been keeping him awake at nighttime.    Patient was seen by my colleague on November 12 reporting symptoms of gastrointestinal bleeding, and subsequently  seen by gastroenterology on December 14.  He was advised to undergo an EGD and colonoscopy along with a CT enterography with contrast.  Those were scheduled for this coming week, but subsequently canceled due to potential for neurosurgical intervention on the spine.  Patient and girlfriend inquire as to whether the still need to be done in the near future.  Patient denies any symptoms of GI bleeding within the past 2 months, and most recent CBC blood count has revealed no anemia.    Back Pain  Onset/Duration:  Chronic, worse in past 7 days  Description:   Location of pain: low back left  Character of pain: sharp and constant  Pain radiation: radiates into the left buttocks, radiates into the left leg and radiates into the left foot  New numbness or weakness in legs, not attributed to pain: no   Intensity: moderate, severe  Progression of Symptoms: same  History:   Specific cause: none  Pain interferes with job:  no   History of back problems: previous herniated disc  Any previous MRI or X-rays: Yes- at [facility].  Date 12/31 see above  Sees a specialist for back pain: Yes, Dr. Shearer, appointment made for 2 days from now  Alleviating factors:   Improved by: rest    Precipitating factors:  Worsened by: Standing  Therapies tried and outcome: acetaminophen (Tylenol), activity, muscle relaxants, NSAIDs and Physical Therapy    Accompanying Signs & Symptoms:  Risk of Fracture: None  Risk of Cauda Equina: None  Risk of Infection: None  Risk of Cancer: None  Risk of  Ankylosing Spondylitis: Onset at age <35, male, AND morning back stiffness  no         Virtual visit tomorrow 9am Neurosurgery      Review of Systems   Constitutional, HEENT, cardiovascular, pulmonary, GI, , musculoskeletal, neuro, skin, endocrine and psych systems are negative, except as otherwise noted.      Objective           Vitals:  No vitals were obtained today due to virtual visit.    Physical Exam   GENERAL: Healthy, alert and no distress  EYES: Eyes grossly normal to inspection.  No discharge or erythema, or obvious scleral/conjunctival abnormalities.  RESP: No audible wheeze, cough, or visible cyanosis.  No visible retractions or increased work of breathing.    SKIN: Visible skin clear. No significant rash, abnormal pigmentation or lesions.  NEURO: Cranial nerves grossly intact.  Mentation and speech appropriate for age.  PSYCH: Mentation appears normal, affect normal/bright, judgement and insight intact, normal speech and appearance well-groomed.    No results found for any visits on 01/05/21.  Mr Lumbar Spine W/o Contrast    Result Date: 12/31/2020  MR LUMBAR SPINE W/O CONTRAST 12/31/2020 10:11 AM Provided History: Radiculopathy, < 6wks, no red flags, no prior management; Lumbar radiculopathy ICD-10: Lumbar radiculopathy Comparison: None Technique: Sagittal T1-weighted, sagittal STIR, 3D volumetric axial and sagittal reconstructed T2-weighted images of the lumbar spine were obtained without intravenous contrast. Findings: There are 5 lumbar-type vertebrae assumed for the purposes of this dictation.  The tip of the conus medullaris is at L1-2. Mild opposing endplate edema at L5-S1. Disc height loss and disc desiccation at L4-5 and L5-S1. On a level by level basis: T12-L1: No spinal canal or neuroforaminal stenosis. L1-2: No spinal canal or neuroforaminal stenosis. L2-3: Mild bilateral facet arthropathy without neural foraminal narrowing or spinal canal stenosis. L3-4: Mild bilateral facet arthropathy  without neural foraminal narrowing or spinal canal stenosis. L4-5: Disc bulge and superimposed tiny central protrusion. Bilateral facet arthrosis. Mild bilateral neural foraminal stenosis. No spinal canal stenosis. L5-S1: Left subarticular superiorly migrating disc extrusion effacing the lateral recess and compressing on the descending cauda equina nerve roots on the left. There is symmetric moderate spinal canal stenosis on the left. Left greater than right facet hypertrophy. Moderate bilateral neural foraminal stenosis. Paraspinous tissues are within normal limits.     Impression: Superiorly migrating left subarticular extrusion at L5-S1 compresses on the descending cauda equina nerve roots on the left. All of the descending nerves are compressed by the extrusion. Mild to moderate bilateral neural foraminal stenosis at L5-S1. No significant spinal canal or neural foraminal stenosis in the remainder of the lumbar spine. I have personally reviewed the examination and initial interpretation and I agree with the findings. CHERI WELCH MD    I spent a total of 30 minutes face-to-face with Jimmy López III during today's office visit.  Over 50% of this time was spent counseling the patient and/or coordinating care regarding back pain treatment plan.  See note for details.        Video-Visit Details    Type of service:  Video Visit    Video End Time:2 pm    Originating Location (pt. Location): Home    Distant Location (provider location):  [facility]     Platform used for Video Visit: [employer]

## 2021-01-05 NOTE — PATIENT INSTRUCTIONS
Check with Neurosurgery on Physical Therapy recommendations.    Start Tramadol at bedtime if needed.    Continue other medications.    If bleeding resumes let us know.

## 2021-01-07 ENCOUNTER — VIRTUAL VISIT (OUTPATIENT)
Dept: NEUROSURGERY | Facility: CLINIC | Age: 41
End: 2021-01-07
Payer: COMMERCIAL

## 2021-01-07 ENCOUNTER — PRE VISIT (OUTPATIENT)
Dept: NEUROSURGERY | Facility: CLINIC | Age: 41
End: 2021-01-07

## 2021-01-07 DIAGNOSIS — M54.16 LUMBAR RADICULOPATHY: Primary | ICD-10-CM

## 2021-01-07 DIAGNOSIS — M51.26 LUMBAR HERNIATED DISC: ICD-10-CM

## 2021-01-07 PROCEDURE — 99204 OFFICE O/P NEW MOD 45 MIN: CPT | Mod: 95 | Performed by: NURSE PRACTITIONER

## 2021-01-07 NOTE — PATIENT INSTRUCTIONS
~Referral to spine neurosurgeon for surgical intervention     At the end of the visit, all the patient's questions and concerns had been addressed and the patient was agreeable with the plan of care as outlined above. The patient has our office contact information at [phone], and knows to call with any questions, concerns, or changes in condition.

## 2021-01-07 NOTE — PROGRESS NOTES
"Jimmy is a 40 year old who is being evaluated via a billable video visit.      This is a video/telephone visit conducted due to [facility] on non-urgent clinic visits due to risk of the spread of COVID-19 pandemic virus. The patient did not identify any urgent or red-flag symptoms that would require in-person evaluation.    How would you like to obtain your AVS? MyChart  If the video visit is dropped, the invitation should be resent by: Cell  Will anyone else be joining your video visit?  Yes, patient's girlfriend.          Video Start Time:  11:37 am     Video-Visit Details    Type of service:  Video Visit    Video End Time:   12:05 pm     Originating Location (pt. Location): Home    Distant Location (provider location):  [facility] NEUROSURG[facility]     Platform used for Video Visit: [employer]    Reason for visit:             Back pain and Left leg radiculopathy d/t large disc herniation     History of present illness:  Jimmy López, NARCISA is a 40 year old male with pmh of celiac disease, depression, PTSD, anxiety, avascular necrosis of the left hip s/p ADRI, lumbar disc herniation, who is seen for his back pain and left leg pain/symptoms   Pain symptoms began back in 2007.    He had a significant disc herniation in 2014 -15 causing \"paralysis\" of both legs, and he was not able to ambulate.  He was taken to the ER.   He underwent spine injections, physical therapy, and pain/symptoms eventually improved.    He states he feels there have been other \"flare-ups\" of disc herniation in the past, but does not recall specific dates.   He was found to have issues with left hip, and subsequently required left hip replacement.   Recently, he developed GI bleeding (November) and evaluated by Gastroenterology.   He has been participating in physical therapy for back/left leg. Pain/symtpoms not worsening, but constant and no improvement.   Rx'd Medrol Dose stevie, " "Gabapentin, and Flexeril which have not provided pain relief.   He has not undergone spinal injection.     Currently,   He is having back pain.  Constant-  Across lower back.   Stabbing pain in left thigh (lateral)  Pain radiates down left leg (posterior), to buttock, then down leg into toes  Numbness down the back of the leg and into the bottom of the foot.   \"Pulling pain\" down the back of the leg and left posterior calf (feels like a \"pulled muscle\")  Constant numbness last three toes.  Can have \"whole foot\" go numb\"   He has subjective weakness in left leg.   \"I Try not to walk on it\"   No saddle anesthesia   Ambulating independently without assistive device   No bowel or bladder concerns or complaints   Occas will dribble after urinating, but no urinary incontinence.        Review of systems: 10 point ROS negative except for as detailed in HPI  Past Medical History:   Past Medical History:   Diagnosis Date     Celiac disease 07/2020     Sciatica 5/2014     Lower Back Pain     Lumbar herniated disc 2014    PTSD (post-traumatic stress disorder)     Depression     Anxiety     Avascular necrosis of hip, left (H)        Surgical History:   Left total hip replacement - Dec 2018     Medications:  Current Outpatient Medications   Medication     cyclobenzaprine (FLEXERIL) 5 MG tablet          Makes him sleepy      famotidine (PEPCID) 20 MG tablet     gabapentin (NEURONTIN) 300 MG capsule                 TID-  Not helpful for pain      HYDROXYZINE PAMOATE PO     methylPREDNISolone (MEDROL DOSEPAK) 4 MG tablet therapy pack           Not Helpful      nicotine (NICODERM CQ) 14 MG/24HR 24 hr patch     pantoprazole (PROTONIX) 40 MG EC tablet     sertraline (ZOLOFT) 100 MG tablet     traZODone (DESYREL) 50 MG tablet     No current facility-administered medications for this visit.        Social History     Tobacco Use     Smoking status: Current Every Day Smoker  -   1/2 pack a day      Packs/day: 0.25     Types: Cigarettes     " Smokeless tobacco: Former User     Types: Chew     Tobacco comment: using patch, down to 5-6cigs /day as of 12/2020   Substance Use Topics     Alcohol use: Yes     Drug use: Not Currently     Family History   Problem Relation Age of Onset     Deep Vein Thrombosis (DVT) Mother      Deep Vein Thrombosis Mother      Crohn's Disease Cousin          Physical exam:   There were no vitals taken for this visit.    General    Alert, cooperative.  No acute distress  Pulmonary:   Breathing comfortably on room air. No cough, or shortness of breath  Skin:    Visible skin without lesions or obvious rash  Speech is fluent  Maintains eye contact  Musculoskeletal:    Moving extremities freely with good strength  He is able to raise up his toes/heels bilaterally     Imaging:  MR LUMBAR SPINE W/O CONTRAST 12/31/2020 10:11 AM    Technique: Sagittal T1-weighted, sagittal STIR, 3D volumetric axial  and sagittal reconstructed T2-weighted images of the lumbar spine were  obtained without intravenous contrast.      Findings: There are 5 lumbar-type vertebrae assumed for the purposes  of this dictation.  The tip of the conus medullaris is at L1-2. Mild  opposing endplate edema at L5-S1. Disc height loss and disc  desiccation at L4-5 and L5-S1.      On a level by level basis:     T12-L1: No spinal canal or neuroforaminal stenosis.     L1-2: No spinal canal or neuroforaminal stenosis.     L2-3: Mild bilateral facet arthropathy without neural foraminal  narrowing or spinal canal stenosis.     L3-4: Mild bilateral facet arthropathy without neural foraminal  narrowing or spinal canal stenosis.     L4-5: Disc bulge and superimposed tiny central protrusion. Bilateral  facet arthrosis. Mild bilateral neural foraminal stenosis. No spinal  canal stenosis.     L5-S1: Left subarticular superiorly migrating disc extrusion effacing  the lateral recess and compressing on the descending cauda equina  nerve roots on the left. There is symmetric moderate  spinal canal  stenosis on the left. Left greater than right facet hypertrophy.  Moderate bilateral neural foraminal stenosis.     Paraspinous tissues are within normal limits.                                                                    Impression: Superiorly migrating left subarticular extrusion at L5-S1  compresses on the descending cauda equina nerve roots on the left. All  of the descending nerves are compressed by the extrusion. Mild to  moderate bilateral neural foraminal stenosis at L5-S1. No significant  spinal canal or neural foraminal stenosis in the remainder of the  lumbar spine.     I have personally reviewed the examination and initial interpretation  and I agree with the findings.     CHERI WELCH MD    I have personally reviewed imaging and agree with the radiologist's interpretation   and findings as outlined above.       Assessment:  ~Disc herniation at L5-S1 with left leg S1 radiculopathy   ~No signs/symptoms of cauda equina       Plan:  ~Continue medication regimen  ~Hold on physical therapy   ~Avoid heavy lifting, or significant twisting of the spine   ~Referral to spine neurosurgeon for surgical intervention     At the end of the visit, all the patient's questions and concerns had been addressed and the patient was agreeable with the plan of care as outlined above. The patient has our office contact information at [phone], and knows to call with any questions, concerns, or changes in condition.        Cristiana Shearer DNP  Neurosurgery Nurse Practitioner  [facility]  [phone]

## 2021-01-07 NOTE — LETTER
"1/7/2021       RE: Jimmy López III  [address]  [place] [address]     Dear Colleague,    Thank you for referring your patient, Jimmy López III, to the [facility] NEUROSURGERY [facility] at [facility]. Please see a copy of my visit note below.    Jimmy is a 40 year old who is being evaluated via a billable video visit.      This is a video/telephone visit conducted due to [facility] systemwide and [facility] restrictions on non-urgent clinic visits due to risk of the spread of COVID-19 pandemic virus. The patient did not identify any urgent or red-flag symptoms that would require in-person evaluation.    How would you like to obtain your AVS? MyChart  If the video visit is dropped, the invitation should be resent by: Cell  Will anyone else be joining your video visit?  Yes, patient's girlfriend.          Video Start Time:  11:37 am     Video-Visit Details    Type of service:  Video Visit    Video End Time:   12:05 pm     Originating Location (pt. Location): Home    Distant Location (provider location):  [facility] NEUROSURGERY [facility]     Platform used for Video Visit: Traitify    Reason for visit:             Back pain and Left leg radiculopathy d/t large disc herniation     History of present illness:  Jimmy López III is a 40 year old male with pmh of celiac disease, depression, PTSD, anxiety, avascular necrosis of the left hip s/p ADRI, lumbar disc herniation, who is seen for his back pain and left leg pain/symptoms   Pain symptoms began back in 2007.    He had a significant disc herniation in 2014 -15 causing \"paralysis\" of both legs, and he was not able to ambulate.  He was taken to the ER.   He underwent spine injections, physical therapy, and pain/symptoms eventually improved.    He states he feels there have been other \"flare-ups\" of disc herniation in the past, but does not recall specific dates.   He " "was found to have issues with left hip, and subsequently required left hip replacement.   Recently, he developed GI bleeding (November) and evaluated by Gastroenterology.   He has been participating in physical therapy for back/left leg. Pain/symtpoms not worsening, but constant and no improvement.   Rx'd Medrol Dose stevie, Gabapentin, and Flexeril which have not provided pain relief.   He has not undergone spinal injection.     Currently,   He is having back pain.  Constant-  Across lower back.   Stabbing pain in left thigh (lateral)  Pain radiates down left leg (posterior), to buttock, then down leg into toes  Numbness down the back of the leg and into the bottom of the foot.   \"Pulling pain\" down the back of the leg and left posterior calf (feels like a \"pulled muscle\")  Constant numbness last three toes.  Can have \"whole foot\" go numb\"   He has subjective weakness in left leg.   \"I Try not to walk on it\"   No saddle anesthesia   Ambulating independently without assistive device   No bowel or bladder concerns or complaints   Occas will dribble after urinating, but no urinary incontinence.        Review of systems: 10 point ROS negative except for as detailed in HPI  Past Medical History:   Past Medical History:   Diagnosis Date     Celiac disease 07/2020     Sciatica 5/2014     Lower Back Pain     Lumbar herniated disc 2014    PTSD (post-traumatic stress disorder)     Depression     Anxiety     Avascular necrosis of hip, left (H)        Surgical History:   Left total hip replacement - Dec 2018     Medications:  Current Outpatient Medications   Medication     cyclobenzaprine (FLEXERIL) 5 MG tablet          Makes him sleepy      famotidine (PEPCID) 20 MG tablet     gabapentin (NEURONTIN) 300 MG capsule                 TID-  Not helpful for pain      HYDROXYZINE PAMOATE PO     methylPREDNISolone (MEDROL DOSEPAK) 4 MG tablet therapy pack           Not Helpful      nicotine (NICODERM CQ) 14 MG/24HR 24 hr patch     " pantoprazole (PROTONIX) 40 MG EC tablet     sertraline (ZOLOFT) 100 MG tablet     traZODone (DESYREL) 50 MG tablet     No current facility-administered medications for this visit.        Social History     Tobacco Use     Smoking status: Current Every Day Smoker  -   1/2 pack a day      Packs/day: 0.25     Types: Cigarettes     Smokeless tobacco: Former User     Types: Chew     Tobacco comment: using patch, down to 5-6cigs /day as of 12/2020   Substance Use Topics     Alcohol use: Yes     Drug use: Not Currently     Family History   Problem Relation Age of Onset     Deep Vein Thrombosis (DVT) Mother      Deep Vein Thrombosis Mother      Crohn's Disease Cousin          Physical exam:   There were no vitals taken for this visit.    General    Alert, cooperative.  No acute distress  Pulmonary:   Breathing comfortably on room air. No cough, or shortness of breath  Skin:    Visible skin without lesions or obvious rash  Speech is fluent  Maintains eye contact  Musculoskeletal:    Moving extremities freely with good strength  He is able to raise up his toes/heels bilaterally     Imaging:  MR LUMBAR SPINE W/O CONTRAST 12/31/2020 10:11 AM    Technique: Sagittal T1-weighted, sagittal STIR, 3D volumetric axial  and sagittal reconstructed T2-weighted images of the lumbar spine were  obtained without intravenous contrast.      Findings: There are 5 lumbar-type vertebrae assumed for the purposes  of this dictation.  The tip of the conus medullaris is at L1-2. Mild  opposing endplate edema at L5-S1. Disc height loss and disc  desiccation at L4-5 and L5-S1.      On a level by level basis:     T12-L1: No spinal canal or neuroforaminal stenosis.     L1-2: No spinal canal or neuroforaminal stenosis.     L2-3: Mild bilateral facet arthropathy without neural foraminal  narrowing or spinal canal stenosis.     L3-4: Mild bilateral facet arthropathy without neural foraminal  narrowing or spinal canal stenosis.     L4-5: Disc bulge and  superimposed tiny central protrusion. Bilateral  facet arthrosis. Mild bilateral neural foraminal stenosis. No spinal  canal stenosis.     L5-S1: Left subarticular superiorly migrating disc extrusion effacing  the lateral recess and compressing on the descending cauda equina  nerve roots on the left. There is symmetric moderate spinal canal  stenosis on the left. Left greater than right facet hypertrophy.  Moderate bilateral neural foraminal stenosis.     Paraspinous tissues are within normal limits.                                                                    Impression: Superiorly migrating left subarticular extrusion at L5-S1  compresses on the descending cauda equina nerve roots on the left. All  of the descending nerves are compressed by the extrusion. Mild to  moderate bilateral neural foraminal stenosis at L5-S1. No significant  spinal canal or neural foraminal stenosis in the remainder of the  lumbar spine.     I have personally reviewed the examination and initial interpretation  and I agree with the findings.     CHERI WELCH MD    I have personally reviewed imaging and agree with the radiologist's interpretation   and findings as outlined above.       Assessment:  ~Disc herniation at L5-S1 with left leg S1 radiculopathy   ~No signs/symptoms of cauda equina       Plan:  ~Continue medication regimen  ~Hold on physical therapy   ~Avoid heavy lifting, or significant twisting of the spine   ~Referral to spine neurosurgeon for surgical intervention     At the end of the visit, all the patient's questions and concerns had been addressed and the patient was agreeable with the plan of care as outlined above. The patient has our office contact information at [phone], and knows to call with any questions, concerns, or changes in condition.        Cristiana Shearer DNP  Neurosurgery Nurse Practitioner  [facility]  [phone]

## 2021-01-13 ENCOUNTER — HOSPITAL ENCOUNTER (OUTPATIENT)
Facility: AMBULATORY SURGERY CENTER | Age: 41
End: 2021-01-13
Attending: ANESTHESIOLOGY
Payer: COMMERCIAL

## 2021-01-13 DIAGNOSIS — M54.17 LUMBOSACRAL RADICULOPATHY: Primary | ICD-10-CM

## 2021-01-13 DIAGNOSIS — M54.17 LUMBOSACRAL RADICULOPATHY: ICD-10-CM

## 2021-01-19 ENCOUNTER — TELEPHONE (OUTPATIENT)
Dept: NEUROSURGERY | Facility: CLINIC | Age: 41
End: 2021-01-19

## 2021-01-19 NOTE — TELEPHONE ENCOUNTER
Please contact patient regarding visit on 01/07/2021. Patient states he believe he was having surgery.     Tracie Phoenix LPN  Neurosurgery

## 2021-01-19 NOTE — TELEPHONE ENCOUNTER
[facility] Call Center    Phone Message    May a detailed message be left on voicemail: yes     Reason for Call: Other: Jimmy calling to request a call back to discuss the upcoming procedure on 2/5/21. He stated that he thought he was having surgery, so he took time off work. He is looking for clarification as to what is going to be done. Please call Jimmy at your earliest convenience to discuss.     Action Taken: Message routed to:  [facility] ([facility]): [facility] NE[facility]GERY    Travel Screening: Not Applicable

## 2021-01-19 NOTE — TELEPHONE ENCOUNTER
Referral from WAQAS Shearer DNP to Spine Neurosurgery for surgical consultant.     Please schedule with first available Spine Surgeon.     Tracie Phoenix LPN  Neurosurgery

## 2021-01-19 NOTE — TELEPHONE ENCOUNTER
[facility] Call Center    Phone Message    May a detailed message be left on voicemail: yes     Reason for Call: Other: Jimmy calling in regards to appointment that is scheduled for 2/5/21 with Dr. Jolynn Mccullough - States that he took off of work for this appointment thinking it was surgery. States that he also has to get a COVID test prior and is wondering if he will have to go through all of this again when surgery would be scheduled.     Patient states that he is confused who made this injection appointment in the first place. States that he called the pain clinic (which per [employer] [facility] medical [facility] website that is where this Dr. Mccullough) is out of - states that pain clinic has no idea who that provider is.     Patient states he is confused why scheduled for this injection.     Please advise and call patient back at your earliest convenience      Action Taken: Other: [facility] NEUROSURGERY     Travel Screening: Not Applicable

## 2021-01-20 NOTE — TELEPHONE ENCOUNTER
SPINE PATIENTS - NEW PROTOCOL PREVISIT    RECORDS RECEIVED FROM: Internal   Date of Appt: 1/28/21   NOTES (FOR ALL VISITS) STATUS DETAILS   OFFICE NOTE from referring provider Internal Cristiana Shearer NP @ [facility] Neurosurgery:  1/7/21   OFFICE NOTE from other specialist N/A    DISCHARGE SUMMARY from hospital N/A    DISCHARGE REPORT from ER N/A    EMG REPORT N/A    MEDICATION LIST Internal    IMAGING  (FOR ALL VISITS)     MRI (HEAD, NECK, SPINE) Internal [facility]:  MRI Lumbar Spine 12/31/20   XRAY (SPINE) *NEUROSURGERY* N/A    CT (HEAD, NECK, SPINE) N/A

## 2021-01-24 DIAGNOSIS — Z11.59 ENCOUNTER FOR SCREENING FOR OTHER VIRAL DISEASES: Primary | ICD-10-CM

## 2021-01-28 ENCOUNTER — PRE VISIT (OUTPATIENT)
Dept: NEUROSURGERY | Facility: CLINIC | Age: 41
End: 2021-01-28

## 2021-01-28 ENCOUNTER — VIRTUAL VISIT (OUTPATIENT)
Dept: NEUROSURGERY | Facility: CLINIC | Age: 41
End: 2021-01-28
Payer: COMMERCIAL

## 2021-01-28 ENCOUNTER — TELEPHONE (OUTPATIENT)
Dept: NEUROSURGERY | Facility: CLINIC | Age: 41
End: 2021-01-28

## 2021-01-28 DIAGNOSIS — M51.16 LUMBAR DISC HERNIATION WITH RADICULOPATHY: Primary | ICD-10-CM

## 2021-01-28 DIAGNOSIS — R12 HEARTBURN: ICD-10-CM

## 2021-01-28 DIAGNOSIS — M48.061 SPINAL STENOSIS OF LUMBAR REGION WITHOUT NEUROGENIC CLAUDICATION: ICD-10-CM

## 2021-01-28 PROCEDURE — 99214 OFFICE O/P EST MOD 30 MIN: CPT | Mod: 95 | Performed by: NEUROLOGICAL SURGERY

## 2021-01-28 NOTE — PROGRESS NOTES
"Jimmy is a 41 year old who is being evaluated via a billable video visit.      How would you like to obtain your AVS? MyChart  If the video visit is dropped, the invitation should be resent by: Send to e-mail at: [email or web address]  Will anyone else be joining your video visit? No      Video Start Time: 8:03 AM  Video-Visit Details    Type of service:  Video Visit    Video End Time:8:25 AM    Originating Location (pt. Location): Home    Distant Location (provider location):  [facility] NEUROSURGERY CLINIC [place]     Platform used for Video Visit: Boo Phoenix, EMT        Provider Notes:  This is a video visit conducted due to systemwide and statewide restrictions on non-urgent clinic visits due to COVID-19 pandemic concerns. The patient did not identify any urgent or red-flag symptoms that would require in-person evaluation.        Neurosurgery Clinic Note    Chief Complaint: left leg pain    History of Present Illness:  It was a pleasure to evaluate Jimmy López III in clinic today at the kind referral of ABDOUL Oneil CNP  [address] [place] [address].    Jimmy López III is a 41 year old male presenting with years of low back and left leg pain radiating down lateral and posterior thigh to bottom of foot, constant foot numbness, subjective weakness in left leg. Worse with ambulating, minor relief with rest; has urinary dribbling after voiding which is chronic    Had episode he reports in [id number] where he had \"paralysis of both legs and couldn't walk\"; he reports he was seen in an ER, had spine injections and PT, and his symptoms lessened with these but have never completely resolved    He has done PT without improvement  He has had Medrol DosePak and gabapentin and flexeril prior to surgical referral without help  Patient's wife states he drinks nine [employer] alcoholic beverages about 3 times per week; patient states he is in therapy for " this.      Seen by Cristiana Shearer        Past Medical History:   Diagnosis Date     Celiac disease 07/2020     Sciatica 5/2014   depression, PTSD, avascular necrosis of left hip      Past Surgical History- left ADRI  2018, no prior spine surgery    Social History     Socioeconomic History     Marital status:      Spouse name: Not on file     Number of children: Not on file     Years of education: Not on file     Highest education level: Not on file   Occupational History     Not on file   Social Needs     Financial resource strain: Not on file     Food insecurity     Worry: Not on file     Inability: Not on file     Transportation needs     Medical: Not on file     Non-medical: Not on file   Tobacco Use     Smoking status: Current Every Day Smoker     Packs/day: 0.25     Types: Cigarettes     Smokeless tobacco: Former User     Types: Chew     Tobacco comment: using patch, down to 5-6cigs /day as of 12/2020   Substance and Sexual Activity     Alcohol use: Yes     Drug use: Not Currently     Sexual activity: Yes     Partners: Female     Birth control/protection: Patch   Lifestyle     Physical activity     Days per week: Not on file     Minutes per session: Not on file     Stress: Not on file   Relationships     Social connections     Talks on phone: Not on file     Gets together: Not on file     Attends Mandaen service: Not on file     Active member of club or organization: Not on file     Attends meetings of clubs or organizations: Not on file     Relationship status: Not on file     Intimate partner violence     Fear of current or ex partner: Not on file     Emotionally abused: Not on file     Physically abused: Not on file     Forced sexual activity: Not on file   Other Topics Concern     Not on file   Social History Narrative     Not on file       family history includes Crohn's Disease in his cousin; Deep Vein Thrombosis in his mother; Deep Vein Thrombosis (DVT) in his mother.        IMAGING per my own  measurement and interpretation:  MRI: disc extrusion cephalad migration left L5-S1 extends to left L5 pedicle          Mr Lumbar Spine W/o Contrast    Result Date: 12/31/2020  MR LUMBAR SPINE W/O CONTRAST 12/31/2020 10:11 AM Provided History: Radiculopathy, < 6wks, no red flags, no prior management; Lumbar radiculopathy ICD-10: Lumbar radiculopathy Comparison: None Technique: Sagittal T1-weighted, sagittal STIR, 3D volumetric axial and sagittal reconstructed T2-weighted images of the lumbar spine were obtained without intravenous contrast. Findings: There are 5 lumbar-type vertebrae assumed for the purposes of this dictation.  The tip of the conus medullaris is at L1-2. Mild opposing endplate edema at L5-S1. Disc height loss and disc desiccation at L4-5 and L5-S1. On a level by level basis: T12-L1: No spinal canal or neuroforaminal stenosis. L1-2: No spinal canal or neuroforaminal stenosis. L2-3: Mild bilateral facet arthropathy without neural foraminal narrowing or spinal canal stenosis. L3-4: Mild bilateral facet arthropathy without neural foraminal narrowing or spinal canal stenosis. L4-5: Disc bulge and superimposed tiny central protrusion. Bilateral facet arthrosis. Mild bilateral neural foraminal stenosis. No spinal canal stenosis. L5-S1: Left subarticular superiorly migrating disc extrusion effacing the lateral recess and compressing on the descending cauda equina nerve roots on the left. There is symmetric moderate spinal canal stenosis on the left. Left greater than right facet hypertrophy. Moderate bilateral neural foraminal stenosis. Paraspinous tissues are within normal limits.     Impression: Superiorly migrating left subarticular extrusion at L5-S1 compresses on the descending cauda equina nerve roots on the left. All of the descending nerves are compressed by the extrusion. Mild to moderate bilateral neural foraminal stenosis at L5-S1. No significant spinal canal or neural foraminal stenosis in the  "remainder of the lumbar spine. I have personally reviewed the examination and initial interpretation and I agree with the findings. CHERI WELCH MD      Vitamin D:  Vitamin D Deficiency Screening Results:  No results found for: VITDT  No results found for: VWN918, IBYE123, [id number], VITD3, D2VIT, D3VIT, DTOT, [id number], [id number], [id number], [id number], [id number], [id number]      Nutritional Status:  Estimated body mass index is 23.87 kg/m  as calculated from the following:    Height as of 12/29/20: 1.829 m (6').    Weight as of 12/29/20: 79.8 kg (176 lb).    Lab Results   Component Value Date    ALBUMIN 3.8 11/12/2020       Diabetes Screening:  No results found for: A1C    Nicotine Usage:  Yes- active smoker             Video Physical Exam   There were no vitals taken for this visit.  Constitutional: Oriented to person, place, and time. Appears well-developed and well-nourished. Cooperative. No distress.     Musculoskeletal: Lumbar flexion/extension range of motion: limited extension due to pain    Neurological: alert and oriented to person, place, and time.   No cranial nerve deficit   sensory deficit left foot lateral 3 toes  Gait antalgic, reduced weight bearing left  Unable to do one single-leg standing calf raise on left, able to do repeatedly on right        ASSESSMENT:  Jimmy López III is a 41 year old male with ongoing nicotine usage, large left L5-S1 disc herniation with extrusion and cephalad migration with central and lateral recess stenosis, alcohol usage    PLAN:    Disc herniation is large and unlikely to resolve pain without surgical management; weakness and numbness may be permanent at this point because patient has had long-standing symptoms. Additionally, with surgery, it is unlikely to see complete resolution of disc herniation on imaging with removal of the extruded fragment; there is a large annular tear present that usually always will have some \"bulging disc residual\" read by " Radiology on potential future MRIs, even with maximal visualized surgical resection.  Due to the size of the disc extrusion and the extent of the laminectomy required to resect it adequately, I would recommend an open midline approach rather than a minimally-invasive approach. Such large disc extrusions have higher risk of postoperative epidural hematoma formation and return to OR, this is potentially due to the large amount of epidural venous engorgement that occurs around such large disc extrusions. I discussed this as a risk with the patient.    I discussed surgical risk including bleeding, infection, nerve root damage, failure to heal, CSF leak, weakness/paralysis, numbness, worsening pain or failure to improve including neuropathic pain, recurrence of problem, and potential for development of instability and need for further procedures or surgeries. I discussed the risks of general anesthesia including stroke, heart attack, pneumonia, prolonged intubation, blood clot, pulmonary embolism, blindness, pressure ulcer or neuropathy, and urinary tract infection.     I recommended that the patient stop nicotine usage for overall health but specifically for spine health. I cautioned him about higher surgical spine complication rates in cigarette smokers.    Surgical intervention would be:  Open left lumbar 5-sacral 1 hemilaminectomy, medial facetectomy, and microdiskectomy, with microscope  This cannot be scheduled for less than 6 weeks for now since he needs to quit smoking  Urine nicotine test with preop labs  Surgery order placed    Patient's wife states that patient is in mental health treatment for alcohol usage  Patient states he will stop alcohol usage/is currently in therapy for this and will stop nicotine usage prior to surgery; I explained life-threatening risk of alcohol withdrawal if patient continues to engage in binge-drinking prior to surgery    PAC visit for preop risk assessment    Recommend cancelling  epidural steroid injection due to increased risk of surgical infection with close-interval COLT and no likely longterm benefit due to size of disc herniation    Rosie Araujo MD    [facility] Department of Neurosurgery  Complex Spinal Deformity, Scoliosis, and Minimally Invasive Spine Surgery Specialist  Office: [phone]    1/28/2021          I spent 30  minutes in patient care with greater than 50% spent in counseling and/or coordination of care.

## 2021-01-28 NOTE — PATIENT INSTRUCTIONS
Dr. Araujo recommends stopping nicotine usage, and surgery can be scheduled for 6 weeks following your last nicotine usage. We will do a nicotine test prior to surgery and will cancel surgery for positive test results, because nicotine can severely worsen your risk of negative outcome with surgery. Please continue to work with your mental health provider about alcohol usage cessation.

## 2021-01-28 NOTE — LETTER
"1/28/2021       RE: Jimmy López III  [address]  [place] [address]     Dear Colleague,    Thank you for referring your patient, Jimmy López III, to the [facility] NEUROSURGERY CLINIC [place] at [facility]. Please see a copy of my visit note below.    Jimmy is a 41 year old who is being evaluated via a billable video visit.      How would you like to obtain your AVS? MyChart  If the video visit is dropped, the invitation should be resent by: Send to e-mail at: [email or web address]  Will anyone else be joining your video visit? No      Video Start Time: 8:03 AM  Video-Visit Details    Type of service:  Video Visit    Video End Time:8:25 AM    Originating Location (pt. Location): Home    Distant Location (provider location):  [facility] NEUROSURGERY [facility]     Platform used for Video Visit: Boo Phoenix, EMT        Provider Notes:  This is a video visit conducted due to systemwide and statewide restrictions on non-urgent clinic visits due to COVID-19 pandemic concerns. The patient did not identify any urgent or red-flag symptoms that would require in-person evaluation.        Neurosurgery Clinic Note    Chief Complaint: left leg pain    History of Present Illness:  It was a pleasure to evaluate Jimmy López III in clinic today at the kind referral of Cristiana Shearer, [facility]  [address] [place] [address].    Jimmy López III is a 41 year old male presenting with years of low back and left leg pain radiating down lateral and posterior thigh to bottom of foot, constant foot numbness, subjective weakness in left leg. Worse with ambulating, minor relief with rest; has urinary dribbling after voiding which is chronic    Had episode he reports in 3109-1107 where he had \"paralysis of both legs and couldn't walk\"; he reports he was seen in an ER, had spine injections and PT, and his symptoms " lessened with these but have never completely resolved    He has done PT without improvement  He has had Medrol DosePak and gabapentin and flexeril prior to surgical referral without help  Patient's wife states he drinks nine [employer] alcoholic beverages about 3 times per week; patient states he is in therapy for this.      Seen by Cristiana Shearer        Past Medical History:   Diagnosis Date     Celiac disease 07/2020     Sciatica 5/2014   depression, PTSD, avascular necrosis of left hip      Past Surgical History- left ADRI  2018, no prior spine surgery    Social History     Socioeconomic History     Marital status:      Spouse name: Not on file     Number of children: Not on file     Years of education: Not on file     Highest education level: Not on file   Occupational History     Not on file   Social Needs     Financial resource strain: Not on file     Food insecurity     Worry: Not on file     Inability: Not on file     Transportation needs     Medical: Not on file     Non-medical: Not on file   Tobacco Use     Smoking status: Current Every Day Smoker     Packs/day: 0.25     Types: Cigarettes     Smokeless tobacco: Former User     Types: Chew     Tobacco comment: using patch, down to 5-6cigs /day as of 12/2020   Substance and Sexual Activity     Alcohol use: Yes     Drug use: Not Currently     Sexual activity: Yes     Partners: Female     Birth control/protection: Patch   Lifestyle     Physical activity     Days per week: Not on file     Minutes per session: Not on file     Stress: Not on file   Relationships     Social connections     Talks on phone: Not on file     Gets together: Not on file     Attends Buddhist service: Not on file     Active member of club or organization: Not on file     Attends meetings of clubs or organizations: Not on file     Relationship status: Not on file     Intimate partner violence     Fear of current or ex partner: Not on file     Emotionally abused: Not on file      Physically abused: Not on file     Forced sexual activity: Not on file   Other Topics Concern     Not on file   Social History Narrative     Not on file       family history includes Crohn's Disease in his cousin; Deep Vein Thrombosis in his mother; Deep Vein Thrombosis (DVT) in his mother.        IMAGING per my own measurement and interpretation:  MRI: disc extrusion cephalad migration left L5-S1 extends to left L5 pedicle          Mr Lumbar Spine W/o Contrast    Result Date: 12/31/2020  MR LUMBAR SPINE W/O CONTRAST 12/31/2020 10:11 AM Provided History: Radiculopathy, < 6wks, no red flags, no prior management; Lumbar radiculopathy ICD-10: Lumbar radiculopathy Comparison: None Technique: Sagittal T1-weighted, sagittal STIR, 3D volumetric axial and sagittal reconstructed T2-weighted images of the lumbar spine were obtained without intravenous contrast. Findings: There are 5 lumbar-type vertebrae assumed for the purposes of this dictation.  The tip of the conus medullaris is at L1-2. Mild opposing endplate edema at L5-S1. Disc height loss and disc desiccation at L4-5 and L5-S1. On a level by level basis: T12-L1: No spinal canal or neuroforaminal stenosis. L1-2: No spinal canal or neuroforaminal stenosis. L2-3: Mild bilateral facet arthropathy without neural foraminal narrowing or spinal canal stenosis. L3-4: Mild bilateral facet arthropathy without neural foraminal narrowing or spinal canal stenosis. L4-5: Disc bulge and superimposed tiny central protrusion. Bilateral facet arthrosis. Mild bilateral neural foraminal stenosis. No spinal canal stenosis. L5-S1: Left subarticular superiorly migrating disc extrusion effacing the lateral recess and compressing on the descending cauda equina nerve roots on the left. There is symmetric moderate spinal canal stenosis on the left. Left greater than right facet hypertrophy. Moderate bilateral neural foraminal stenosis. Paraspinous tissues are within normal limits.      Impression: Superiorly migrating left subarticular extrusion at L5-S1 compresses on the descending cauda equina nerve roots on the left. All of the descending nerves are compressed by the extrusion. Mild to moderate bilateral neural foraminal stenosis at L5-S1. No significant spinal canal or neural foraminal stenosis in the remainder of the lumbar spine. I have personally reviewed the examination and initial interpretation and I agree with the findings. CHERI WELCH MD      Vitamin D:  Vitamin D Deficiency Screening Results:  No results found for: VITDT  No results found for: MKE770, PJGV543, KRBQ67KACZV, VITD3, D2VIT, D3VIT, DTOT, [id number], [id number], [id number], [id number], [id number], [id number]      Nutritional Status:  Estimated body mass index is 23.87 kg/m  as calculated from the following:    Height as of 12/29/20: 1.829 m (6').    Weight as of 12/29/20: 79.8 kg (176 lb).    Lab Results   Component Value Date    ALBUMIN 3.8 11/12/2020       Diabetes Screening:  No results found for: A1C    Nicotine Usage:  Yes- active smoker             Video Physical Exam   There were no vitals taken for this visit.  Constitutional: Oriented to person, place, and time. Appears well-developed and well-nourished. Cooperative. No distress.     Musculoskeletal: Lumbar flexion/extension range of motion: limited extension due to pain    Neurological: alert and oriented to person, place, and time.   No cranial nerve deficit   sensory deficit left foot lateral 3 toes  Gait antalgic, reduced weight bearing left  Unable to do one single-leg standing calf raise on left, able to do repeatedly on right        ASSESSMENT:  Jimmy López III is a 41 year old male with ongoing nicotine usage, large left L5-S1 disc herniation with extrusion and cephalad migration with central and lateral recess stenosis, alcohol usage    PLAN:    Disc herniation is large and unlikely to resolve pain without surgical management; weakness and  "numbness may be permanent at this point because patient has had long-standing symptoms. Additionally, with surgery, it is unlikely to see complete resolution of disc herniation on imaging with removal of the extruded fragment; there is a large annular tear present that usually always will have some \"bulging disc residual\" read by Radiology on potential future MRIs, even with maximal visualized surgical resection.  Due to the size of the disc extrusion and the extent of the laminectomy required to resect it adequately, I would recommend an open midline approach rather than a minimally-invasive approach. Such large disc extrusions have higher risk of postoperative epidural hematoma formation and return to OR, this is potentially due to the large amount of epidural venous engorgement that occurs around such large disc extrusions. I discussed this as a risk with the patient.    I discussed surgical risk including bleeding, infection, nerve root damage, failure to heal, CSF leak, weakness/paralysis, numbness, worsening pain or failure to improve including neuropathic pain, recurrence of problem, and potential for development of instability and need for further procedures or surgeries. I discussed the risks of general anesthesia including stroke, heart attack, pneumonia, prolonged intubation, blood clot, pulmonary embolism, blindness, pressure ulcer or neuropathy, and urinary tract infection.     I recommended that the patient stop nicotine usage for overall health but specifically for spine health. I cautioned him about higher surgical spine complication rates in cigarette smokers.    Surgical intervention would be:  Open left lumbar 5-sacral 1 hemilaminectomy, medial facetectomy, and microdiskectomy, with microscope  This cannot be scheduled for less than 6 weeks for now since he needs to quit smoking  Urine nicotine test with preop labs  Surgery order placed    Patient's wife states that patient is in mental health " treatment for alcohol usage  Patient states he will stop alcohol usage/is currently in therapy for this and will stop nicotine usage prior to surgery; I explained life-threatening risk of alcohol withdrawal if patient continues to engage in binge-drinking prior to surgery    PAC visit for preop risk assessment    Recommend cancelling epidural steroid injection due to increased risk of surgical infection with close-interval COLT and no likely longterm benefit due to size of disc herniation    Rosie Araujo MD    [facility] Department of Neurosurgery  Complex Spinal Deformity, Scoliosis, and Minimally Invasive Spine Surgery Specialist  Office: [phone]    1/28/2021      I spent 30  minutes in patient care with greater than 50% spent in counseling and/or coordination of care.

## 2021-01-29 ENCOUNTER — HOSPITAL ENCOUNTER (OUTPATIENT)
Facility: AMBULATORY SURGERY CENTER | Age: 41
End: 2021-01-29
Attending: ANESTHESIOLOGY
Payer: COMMERCIAL

## 2021-01-29 ENCOUNTER — TELEPHONE (OUTPATIENT)
Dept: ANESTHESIOLOGY | Facility: CLINIC | Age: 41
End: 2021-01-29

## 2021-01-29 RX ORDER — PANTOPRAZOLE SODIUM 40 MG/1
40 TABLET, DELAYED RELEASE ORAL DAILY
Qty: 90 TABLET | Refills: 0 | Status: SHIPPED | OUTPATIENT
Start: 2021-01-29 | End: 2021-11-11

## 2021-01-29 RX ORDER — FAMOTIDINE 20 MG/1
20 TABLET, FILM COATED ORAL 2 TIMES DAILY PRN
Qty: 180 TABLET | Refills: 0 | Status: SHIPPED | OUTPATIENT
Start: 2021-01-29

## 2021-01-29 NOTE — TELEPHONE ENCOUNTER
Called back and confirmed the cancellation of his procedure with Dr. Mccullough on 2/5/21. Jimmy is having surgery on 3/29/21 with Dr. Rosie Araujo, so he was told no to have the procedure.     Thank you,    Halle Arthur  [facility]  Perioperative

## 2021-01-29 NOTE — TELEPHONE ENCOUNTER
"Requested Prescriptions   Pending Prescriptions Disp Refills     pantoprazole (PROTONIX) 40 MG EC tablet 30 tablet 2     Sig: Take 1 tablet (40 mg) by mouth daily       PPI Protocol Passed - 1/28/2021  8:05 PM        Passed - Not on Clopidogrel (unless Pantoprazole ordered)        Passed - No diagnosis of osteoporosis on record        Passed - Recent (12 mo) or future (30 days) visit within the authorizing provider's specialty     Patient has had an office visit with the authorizing provider or a provider within the authorizing providers department within the previous 12 mos or has a future within next 30 days. See \"Patient Info\" tab in inbasket, or \"Choose Columns\" in Meds & Orders section of the refill encounter.              Passed - Medication is active on med list        Passed - Patient is age 18 or older           famotidine (PEPCID) 20 MG tablet 60 tablet 2     Sig: Take 1 tablet (20 mg) by mouth 2 times daily as needed (heartburn)       H2 Blockers Protocol Passed - 1/28/2021  8:05 PM        Passed - Patient is age 12 or older        Passed - Recent (12 mo) or future (30 days) visit within the authorizing provider's specialty     Patient has had an office visit with the authorizing provider or a provider within the authorizing providers department within the previous 12 mos or has a future within next 30 days. See \"Patient Info\" tab in inbasket, or \"Choose Columns\" in Meds & Orders section of the refill encounter.              Passed - Medication is active on med list           Signed Prescriptions:                        Disp   Refills    pantoprazole (PROTONIX) 40 MG EC tablet    90 tab*0        Sig: Take 1 tablet (40 mg) by mouth daily  Authorizing Provider: CLYDE HERNANDEZ  Ordering User: GIN DUENAS    famotidine (PEPCID) 20 MG tablet           180 ta*0        Sig: Take 1 tablet (20 mg) by mouth 2 times daily as           needed (heartburn)  Authorizing Provider: CLYDE HERNANDEZ  Ordering " User: GIN DUENAS

## 2021-02-04 ENCOUNTER — TELEPHONE (OUTPATIENT)
Dept: NEUROSURGERY | Facility: CLINIC | Age: 41
End: 2021-02-04

## 2021-02-04 ENCOUNTER — PATIENT OUTREACH (OUTPATIENT)
Dept: NEUROSURGERY | Facility: CLINIC | Age: 41
End: 2021-02-04

## 2021-02-04 DIAGNOSIS — Z01.818 PRE-OP EVALUATION: Primary | ICD-10-CM

## 2021-02-04 NOTE — PROGRESS NOTES
Pre-op packet sent via US Mail with instructions to call upon receipt.    Procedure: Left Open L5-S1 Hemilaminectomy; Medial Facetectomy; Microdisc    DOS: 3/31/21 @0730  PAC: 3/17/21 @1030  COVID: 2/29/21

## 2021-02-05 NOTE — TELEPHONE ENCOUNTER
FUTURE VISIT INFORMATION      SURGERY INFORMATION:    Date: 3.31.21    Location: [facility] OR    Surgeon:  Dr. Araujo    Anesthesia Type:  General    Procedure: Open left lumbar 5-sacral 1 hemilaminectomy, medial facetectomy, and microdiskectomy, with microscope    Consult: 21    RECORDS REQUESTED FROM:       Primary Care Provider: Dr. Almeida    Most recent EKG+ [id number]

## 2021-03-11 PROBLEM — M51.16 LUMBAR DISC HERNIATION WITH RADICULOPATHY: Status: RESOLVED | Noted: 2021-01-04 | Resolved: 2021-03-11

## 2021-03-11 NOTE — PROGRESS NOTES
Discharge Note    Progress reporting period is from initial evaluation date (please see noted date below) to Jan 4, 2021.  Linked Episodes   Type: Episode: Status: Noted: Resolved: Last update: Updated by:   PHYSICAL THERAPY low back and L LE 12/30/2020 Active 12/30/2020 1/4/2021  4:00 PM Jorge Mahajan, PT      Comments:       Jimmy failed to follow up and current status is unknown.  Please see information below for last relevant information on current status.  Patient seen for 2 visits.    SUBJECTIVE  Subjective changes noted by patient:  Patient states he is feeling a little better overall. Patient has started taking new steroids for pain - for one week. Patient states MD is thinking he will need surgery in the future. Patient reports consistency with his HEP.   .  Current pain level is (6-7/10).     Previous pain level was  9/10.   Changes in function:  Yes (See Goal flowsheet attached for changes in current functional level)  Adverse reaction to treatment or activity: None    OBJECTIVE  Changes noted in objective findings: lumbar AROM: FL = mod loss and left thigh pain, EXT = mod loss and left thigh pain, right and left SG = min loss and left thigh pain; worse with repeated SG left today; decreased pain sitting in neutral spine     ASSESSMENT/PLAN  Diagnosis: Lumbar radiculopathy   Updated problem list and treatment plan:     STG/LTGs have been met or progress has been made towards goals:  Yes, please see goal flowsheet for most current information  Assessment of Progress: current status is unknown.    Last current status: Pt is progressing slower than anticipated   Self Management Plans:  HEP  I have re-evaluated this patient and find that the nature, scope, duration and intensity of the therapy is appropriate for the medical condition of the patient.  Jimmy continues to require the following intervention to meet STG and LTG's:  HEP.    Recommendations:  Discharge with current home program.  Patient to  follow up with MD as needed.    Please refer to the daily flowsheet for treatment today, total treatment time and time spent performing 1:1 timed codes.

## 2021-03-15 ENCOUNTER — MYC MEDICAL ADVICE (OUTPATIENT)
Dept: NEUROSURGERY | Facility: CLINIC | Age: 41
End: 2021-03-15

## 2021-03-15 DIAGNOSIS — M54.16 LUMBAR RADICULOPATHY: ICD-10-CM

## 2021-03-15 DIAGNOSIS — Z11.59 ENCOUNTER FOR SCREENING FOR OTHER VIRAL DISEASES: ICD-10-CM

## 2021-03-15 RX ORDER — GABAPENTIN 300 MG/1
300 CAPSULE ORAL 3 TIMES DAILY
Qty: 60 CAPSULE | Refills: 1 | Status: SHIPPED | OUTPATIENT
Start: 2021-03-15 | End: 2021-11-11

## 2021-03-15 NOTE — TELEPHONE ENCOUNTER
Writer sent patient MyChart message to patient care team and Neurosurgery Scheduling.     Patient canceling surgery and requesting refill.      Procedure: Left Open L5-S1 Hemilaminectomy; Medial Facetectomy; Microdisc     DOS: 3/31/21 @0730  PAC: 3/17/21 @1030  COVID: 2/29/21    Tracie Phoenix LPN  Neurosurgery

## 2021-03-16 ENCOUNTER — TELEPHONE (OUTPATIENT)
Dept: NEUROSURGERY | Facility: CLINIC | Age: 41
End: 2021-03-16

## 2021-03-16 PROBLEM — M51.16 LUMBAR DISC HERNIATION WITH RADICULOPATHY: Status: ACTIVE | Noted: 2021-01-28

## 2021-03-16 NOTE — TELEPHONE ENCOUNTER
Surgery Scheduled    DOS: 6/29/21  Provider: Dr. Araujo  Location: [facility] OR  PAC: 6/15/21  COVID test: 6/25/21  Post Op: 7/13/21  Patient Called: Called and spoke with the patient. Patient confirmed all appointment dates and times.    Nuvasive: N/A    Extra Imaging: N/A    Additional Notes: Patient requests that his Procedure Packet be sent to his mother's address of [address] [place] [address]

## 2021-03-17 ENCOUNTER — PRE VISIT (OUTPATIENT)
Dept: SURGERY | Facility: CLINIC | Age: 41
End: 2021-03-17

## 2021-03-26 NOTE — TELEPHONE ENCOUNTER
FUTURE VISIT INFORMATION      SURGERY INFORMATION:    Date: 2021    Location: [facility] OR    Surgeon:  Rosie Araujo MD    Anesthesia Type:  General    Procedure: Open left lumbar 5-sacral 1 hemilaminectomy, medial facetectomy, and microdiskectomy, with microscope    Consult: 2021    RECORDS REQUESTED FROM:       Primary Care Provider: Canelo Almeida MD ([facility])    Most recent EKG+ [id number] (Epic)

## 2021-04-07 ENCOUNTER — IMMUNIZATION (OUTPATIENT)
Dept: LAB | Facility: CLINIC | Age: 41
End: 2021-04-07
Payer: COMMERCIAL

## 2021-05-31 ENCOUNTER — RECORDS - HEALTHEAST (OUTPATIENT)
Dept: ADMINISTRATIVE | Facility: CLINIC | Age: 41
End: 2021-05-31

## 2021-06-01 VITALS — HEIGHT: 72 IN | BODY MASS INDEX: 23.57 KG/M2 | WEIGHT: 174 LBS

## 2021-06-01 VITALS — BODY MASS INDEX: 23.08 KG/M2 | HEIGHT: 72 IN | WEIGHT: 170.4 LBS

## 2021-06-01 VITALS — WEIGHT: 171 LBS | HEIGHT: 72 IN | BODY MASS INDEX: 23.16 KG/M2

## 2021-06-02 VITALS — WEIGHT: 177 LBS | BODY MASS INDEX: 23.98 KG/M2 | HEIGHT: 72 IN

## 2021-06-07 NOTE — TELEPHONE ENCOUNTER
FUTURE VISIT INFORMATION        SURGERY INFORMATION:    Date: 2021    Location: [facility] OR    Surgeon:  Rosie Araujo MD    Anesthesia Type:  General    Procedure: Open left lumbar 5-sacral 1 hemilaminectomy, medial facetectomy, and microdiskectomy, with microscope    Consult: 2021     RECORDS REQUESTED FROM:         Primary Care Provider: Canelo Almeida MD ([facility])     Most recent EKG+ [id number] (Epic)

## 2021-06-15 ENCOUNTER — ANESTHESIA EVENT (OUTPATIENT)
Dept: SURGERY | Facility: CLINIC | Age: 41
End: 2021-06-15

## 2021-06-15 ENCOUNTER — PRE VISIT (OUTPATIENT)
Dept: SURGERY | Facility: CLINIC | Age: 41
End: 2021-06-15

## 2021-06-15 ENCOUNTER — VIRTUAL VISIT (OUTPATIENT)
Dept: SURGERY | Facility: CLINIC | Age: 41
End: 2021-06-15
Payer: COMMERCIAL

## 2021-06-15 DIAGNOSIS — Z01.818 PREOP EXAMINATION: Primary | ICD-10-CM

## 2021-06-15 PROCEDURE — 99204 OFFICE O/P NEW MOD 45 MIN: CPT | Mod: 95 | Performed by: PHYSICIAN ASSISTANT

## 2021-06-15 RX ORDER — COVID-19 ANTIGEN TEST
220 KIT MISCELLANEOUS 2 TIMES DAILY WITH MEALS
Status: ON HOLD | COMMUNITY
End: 2021-06-29

## 2021-06-15 ASSESSMENT — LIFESTYLE VARIABLES: TOBACCO_USE: 1

## 2021-06-15 ASSESSMENT — ENCOUNTER SYMPTOMS: SEIZURES: 0

## 2021-06-15 ASSESSMENT — PAIN SCALES - GENERAL: PAINLEVEL: SEVERE PAIN (7)

## 2021-06-15 NOTE — H&P
Pre-Operative H & P     CC:  Preoperative exam to assess for increased cardiopulmonary risk while undergoing surgery and anesthesia.    Date of Encounter: 6/15/2021  Primary Care Physician:  Canelo Almeida  Associated Diagnosis: Lumbar disc herniation with radiculopathy, lumbar spinal stenosis without neurogenic claudication        Video-Visit Details    Type of service:  Video Visit    Patient verbally consented to video service today: YES      Video Start Time: 0912  Video End Time (time video stopped): 0928    Originating Location (pt. Location): Home    Distant Location (provider location):  home     Mode of Communication:  Video Conference via [employer]        HPI  Jimmy López III is a 41 year old male who presents for pre-operative H & P in preparation for Open left lumbar 5-sacral 1 hemilaminectomy, medial facetectomy, and microdiskectomy, with microscope with Dr. Araujo on 6/29/2021 at [facility].     This is a 41-year-old male with past medical history significant for smoking, celiac disease, GERD, EtOH abuse, anxiety, depression, PTSD, avascular necrosis of left hip status post left total hip arthroplasty 2018 at [facility].  Patient presented to Dr. Araujo with complaints of low back pain and left leg pain that radiates down his leg and into the bottom of his foot.  His left foot is numb, he states he has weakness in his left leg.  The symptoms have been present for years and they are worse when he is ambulating.  It is better with rest.  He has participated in physical therapy and he has had corticosteroid injections into his spine with minimal benefit.  Patient reports that he has quit smoking since April.  He reports he has had a couple of cigarettes here and there but for the most part he has been smoke-free since April.  He is ready to proceed.    History is obtained from the patient and the medical record.     Past Medical  History  Past Medical History:   Diagnosis Date     AVN (avascular necrosis of bone) (H)     left hip     Celiac disease 07/2020     Depression      Sciatica 05/2014       Past Surgical History  Past Surgical History:   Procedure Laterality Date     TOTAL HIP ARTHROPLASTY Left     2018       Hx of Blood transfusions/reactions: denies     Hx of abnormal bleeding or anti-platelet use: denies    Menstrual history: No LMP for male patient.:     Steroid use in the last year: yes, medrol Dose Brian    Personal or FH with difficulty with Anesthesia:  See below    Prior to Admission Medications  Current Outpatient Medications   Medication Sig Dispense Refill     Cyanocobalamin (VITAMIN B 12 PO) Take by mouth every morning       cyclobenzaprine (FLEXERIL) 5 MG tablet Take 1 tablet (5 mg) by mouth 2 times daily as needed for muscle spasms 30 tablet 3     famotidine (PEPCID) 20 MG tablet Take 1 tablet (20 mg) by mouth 2 times daily as needed (heartburn) 180 tablet 0     Fexofenadine HCl (ALLERGY 24-HR PO) Take by mouth every morning       Multiple Vitamins-Minerals (BIO-35 GLUTEN-FREE PO) Take by mouth as needed       naproxen sodium 220 MG capsule Take 220 mg by mouth 2 times daily (with meals)       pantoprazole (PROTONIX) 40 MG EC tablet Take 1 tablet (40 mg) by mouth daily (Patient taking differently: Take 40 mg by mouth every morning ) 90 tablet 0     traZODone (DESYREL) 50 MG tablet Take 50 mg by mouth as needed       TURMERIC PO Take by mouth 3 times daily       gabapentin (NEURONTIN) 300 MG capsule Take 1 capsule (300 mg) by mouth 3 times daily Start with 300 mg once daily for 3 days - then increase to 300 mg twice daily for three days, then 300 mg three times daily. (Patient taking differently: Take 300 mg by mouth 3 times daily Start with 300 mg once daily for 3 days - then increase to 300 mg twice daily for three days, then 300 mg three times daily.) 60 capsule 1     HYDROXYZINE PAMOATE PO Take 25 mg by mouth every 4  hours as needed for itching        methylPREDNISolone (MEDROL DOSEPAK) 4 MG tablet therapy pack Follow Package Directions (Patient not taking: Reported on 2021) 21 tablet 0     nicotine (NICODERM CQ) 14 MG/24HR 24 hr patch Place 1 patch onto the skin every 24 hours 14 patch 0     nicotine (NICODERM CQ) 21 MG/24HR 24 hr patch Place 1 patch onto the skin every 24 hours 42 patch 0     nicotine (NICODERM CQ) 7 MG/24HR 24 hr patch Place 1 patch onto the skin every 24 hours 14 patch 0     sertraline (ZOLOFT) 100 MG tablet          Allergies  Allergies   Allergen Reactions     Varenicline      briiares that persisted on this     Gluten Meal Nausea and Vomiting and Diarrhea     Patient states he feels like he is going to die , sometimes has blood in the vomit .     Pollen Extract Difficulty breathing     hayfever       Social History  Social History     Socioeconomic History     Marital status:      Spouse name: Not on file     Number of children: Not on file     Years of education: Not on file     Highest education level: Not on file   Occupational History     Not on file   Social Needs     Financial resource strain: Not on file     Food insecurity     Worry: Not on file     Inability: Not on file     Transportation needs     Medical: Not on file     Non-medical: Not on file   Tobacco Use     Smoking status: Former Smoker     Packs/day: 0.25     Types: Cigarettes     Quit date: 4/15/2021     Years since quittin.1     Smokeless tobacco: Former User     Types: Chew     Tobacco comment: using patch, down to 5-6cigs /day as of 2020   Substance and Sexual Activity     Alcohol use: Yes     Drug use: Not Currently     Sexual activity: Yes     Partners: Female     Birth control/protection: Patch   Lifestyle     Physical activity     Days per week: Not on file     Minutes per session: Not on file     Stress: Not on file   Relationships     Social connections     Talks on phone: Not on file     Gets together:  Not on file     Attends Mosque service: Not on file     Active member of club or organization: Not on file     Attends meetings of clubs or organizations: Not on file     Relationship status: Not on file     Intimate partner violence     Fear of current or ex partner: Not on file     Emotionally abused: Not on file     Physically abused: Not on file     Forced sexual activity: Not on file   Other Topics Concern     Not on file   Social History Narrative     Not on file       Family History  Family History   Problem Relation Age of Onset     Deep Vein Thrombosis (DVT) Mother      Deep Vein Thrombosis Mother      Crohn's Disease Cousin      Colon Cancer No family hx of      Irritable Bowel Syndrome No family hx of            Anesthesia Evaluation   Pt has had prior anesthetic.     History of anesthetic complications   pt reports slow to wake after hip procedure 2018. when he did wake up he had hallucinations.  He subsequently had ADRI without any issues..    ROS/MED HX  ENT/Pulmonary:     (+) tobacco use, Past use,  (-) asthma   Neurologic:  - neg neurologic ROS  (-) no seizures and no CVA   Cardiovascular:  - neg cardiovascular ROS   (+) -----No previous cardiac testing     METS/Exercise Tolerance: >4 METS    Hematologic:  - neg hematologic  ROS  (-) history of blood clots and history of blood transfusion   Musculoskeletal: Comment: Left hip AVN, s/p ADRI 2018      GI/Hepatic: Comment: Celiac disease    (+) GERD, Asymptomatic on medication,     Renal/Genitourinary:  - neg Renal ROS     Endo:  - neg endo ROS     Psychiatric/Substance Use:     (+) psychiatric history depression and other (comment) (PTSD) alcohol abuse     Infectious Disease:  - neg infectious disease ROS     Malignancy:  - neg malignancy ROS     Other:  - neg other ROS          The complete review of systems is negative other than noted in the HPI or here.      Physical Exam    Please refer to the physical examination documented by the  "anesthesiologist in the anesthesia record on the day of surgery    Constitutional: Awake, alert, cooperative, no apparent distress, and appears stated age.  Respiratory: non labored breathing    Neurologic: Awake, alert, oriented to name, place and time.    Neuropsychiatric: Calm, cooperative. Normal affect.     PRIOR LABS/DIAGNOSTIC STUDIES:  All labs and imaging personally reviewed      Labs ordered per surgeon and pendin21    MR LUMBAR SPINE W/O CONTRAST 2020   Impression: Superiorly migrating left subarticular extrusion at L5-S1  compresses on the descending cauda equina nerve roots on the left. All  of the descending nerves are compressed by the extrusion. Mild to  moderate bilateral neural foraminal stenosis at L5-S1. No significant  spinal canal or neural foraminal stenosis in the remainder of the  lumbar spine.    Outside records reviewed from: care everywhere    ASSESSMENT and PLAN  Jimmy López is a 41 year old male scheduled for Open left lumbar 5-sacral 1 hemilaminectomy, medial facetectomy, and microdiskectomy, with microscope on 21 by Dr. Araujo in treatment of lumbar disc herniation with radiculopathy, lumbar spinal stenosis without neurogenic claudication.  PAC referral for risk assessment and optimization for anesthesia:    Pre-operative considerations:  1.  Cardiac:  Functional status- METS >4.  Intermediate risk surgery with 0.4% (RCRI #) risk of major adverse cardiac event.   --denies cardiac history.  Denies any cardiac or exertional symptoms.    2.  Pulm:  Airway feasible.  DONG risk: Low.  --former smoker.  He quit smoking for above procedure.  He states he has had a couple of \"slip ups' over the last two months but has essentially been smoke free for two months.  --nicotine test ordered per Dr. Araujo.    3.  GI:  Risk of PONV score = 2.  If > 2, anti-emetic intervention recommended.  --celiac disease  --GERD, pepcid bid and pantoprazole daily.      4.  MSK:  --h/o left ADRI " for AVN 2018.  No subsequent issues    5.  Psych:  --h/o depression, anxiety, PTSD.  Was on Zoloft but has not been able to refill as he has not seen his psychiatrist recently.  He plans to restart Zoloft.   --he reports drinking 4 Truly drinks on the weekends.  Chart review indicates he may drink more.  Discussed dangers of ETOH binge drinking and withdrawal. He states that he can abstain from drinking prior to surgery. Consideration for withdrawal is warranted.    6.  Anesthesia:  --Patient had hip surgery in 2018 at [facility].  He states it was same-day surgery.  He was slow to wake and when he did wake up he had hallucinations.  He was not kept overnight.  He had subsequent left total hip arthroplasty at [facility] without any hallucinations or anesthesia complications.  I do not have these records.    VTE risk: 3% (male gender, FH of VTE)    Patient is optimized and is acceptable candidate for the proposed procedure.  No further diagnostic evaluation is needed.       Misty Rahman PA-C  Preoperative Assessment Center  [facility]  Phone: [phone]  Fax: [phone]

## 2021-06-15 NOTE — PATIENT INSTRUCTIONS
Preparing for Your Surgery      Name:  Jimmy López III   MRN:  [id number]   :  1980   Today's Date:  6/15/2021       Arriving for surgery:  Surgery date:  2021  Arrival time:  6:00 am    Restrictions due to COVID 19:       One visitor is allowed in the Pre Op area. When you go into surgery, one visitor is allowed to wait in the Surgery Waiting Room       (provided there is enough space to social distance).   After surgery- Two visitors are allowed at a time if you have a private room and one visitor is allowed for those in a semi-private room.   Every 4 days the visitor(s) can rotate. During the 4 day period, the visitor(s) must be consistent. No visitors under the age of 18 years old.   Visiting Hours: 8 am - 8:30 pm   No ill visitors.   All visitors must wear face mask.    [employer] parking is available for anyone with mobility limitations or disabilities.  ([employer]  24 hours/ 7 days a week; [facility]  7 am- 3:30 pm, Mon- Fri)    Please come to:     [facility] [address]  [facility]     [address] [place]  [address]    -Parking is available in the Green Ramp    -Proceed to the 3rd floor, check in at the Adult Surgery Waiting Lounge. [phone]    If an escort is needed stop at the Information Desk in the lobby.        What can I eat or drink?  -  You may eat and drink normally for up to 8 hours before your surgery. (Until midnight )  -  You may have clear liquids until 2 hours before surgery. (Until 6 am arrival time)    Examples of clear liquids:  Water  Clear broth  Juices (apple, white grape, white cranberry  and cider) without pulp  Noncarbonated, powder based beverages  (lemonade and Jacky-Aid)  Sodas (Sprite, 7-Up, ginger ale and seltzer)  Coffee or tea (without milk or cream)  Gatorade    -  No Alcohol for at least 24 hours before surgery     Which medicines can I take?    Hold Aspirin for 7 days  before surgery.     Hold Multivitamins for 7 days before surgery.    Hold Supplements (Turmeric) for 7 days before surgery.  Hold Ibuprofen (Advil, Motrin) for 1 day before surgery    Hold Naproxen (Aleve) for 4 days before surgery.    -  DO NOT take these medications the day of surgery:  Vitamin B12        -  PLEASE TAKE these medications the day of surgery:  Cyclobenzaprine (flexeril) if needed   Famotidine   Fexofenadine   Pantoprazole       How do I prepare myself?  - Please take 2 showers before surgery using Scrubcare or Hibiclens soap.    Use this soap only from the neck to your toes.     Leave the soap on your skin for one minute--then rinse thoroughly.      You may use your own shampoo and conditioner; no other hair products.   - Please remove all jewelry and body piercings.  - No lotions, deodorants or fragrance.  - No makeup or fingernail polish.   - Bring your ID and insurance card.    - All patients are required to have a Covid-19 test within 4 days of surgery/procedure.      -Patients will be contacted by the [facility] scheduling team within 1 week of surgery to make an appointment.      - Patients may call the Scheduling team at [phone] if they have not been scheduled within 4 days of  surgery.      ALL PATIENTS GOING HOME THE SAME DAY OF SURGERY ARE REQUIRED TO HAVE A RESPONSIBLE ADULT TO DRIVE AND BE IN ATTENDANCE WITH THEM FOR 24 HOURS FOLLOWING SURGERY.    IF THE RESPONSIBLE ADULT IS REQUIRED FOR POST OP TEACHING THE POST OP RN WILL ASK THEM TO COME BACK TO THE RECOVERY AREA.    Questions or Concerns:    - For any questions regarding the day of surgery or your hospital stay, please contact the Pre Admission Nursing Office at [phone].       - If you have health changes between today and your surgery please call your surgeon.       For questions after surgery please call your surgeons office.

## 2021-06-15 NOTE — H&P (VIEW-ONLY)
Pre-Operative H & P     CC:  Preoperative exam to assess for increased cardiopulmonary risk while undergoing surgery and anesthesia.    Date of Encounter: 6/15/2021  Primary Care Physician:  Canelo Almeida  Associated Diagnosis: Lumbar disc herniation with radiculopathy, lumbar spinal stenosis without neurogenic claudication        Video-Visit Details    Type of service:  Video Visit    Patient verbally consented to video service today: YES      Video Start Time: 0912  Video End Time (time video stopped): 0928    Originating Location (pt. Location): Home    Distant Location (provider location):  home     Mode of Communication:  Video Conference via [employer]        HPI  Jimmy López III is a 41 year old male who presents for pre-operative H & P in preparation for Open left lumbar 5-sacral 1 hemilaminectomy, medial facetectomy, and microdiskectomy, with microscope with Dr. Araujo on 6/29/2021 at [facility].     This is a 41-year-old male with past medical history significant for smoking, celiac disease, GERD, EtOH abuse, anxiety, depression, PTSD, avascular necrosis of left hip status post left total hip arthroplasty 2018 at [facility].  Patient presented to Dr. Araujo with complaints of low back pain and left leg pain that radiates down his leg and into the bottom of his foot.  His left foot is numb, he states he has weakness in his left leg.  The symptoms have been present for years and they are worse when he is ambulating.  It is better with rest.  He has participated in physical therapy and he has had corticosteroid injections into his spine with minimal benefit.  Patient reports that he has quit smoking since April.  He reports he has had a couple of cigarettes here and there but for the most part he has been smoke-free since April.  He is ready to proceed.    History is obtained from the patient and the medical record.     Past Medical  History  Past Medical History:   Diagnosis Date     AVN (avascular necrosis of bone) (H)     left hip     Celiac disease 07/2020     Depression      Sciatica 05/2014       Past Surgical History  Past Surgical History:   Procedure Laterality Date     TOTAL HIP ARTHROPLASTY Left     2018       Hx of Blood transfusions/reactions: denies     Hx of abnormal bleeding or anti-platelet use: denies    Menstrual history: No LMP for male patient.:     Steroid use in the last year: yes, medrol Dose Brian    Personal or FH with difficulty with Anesthesia:  See below    Prior to Admission Medications  Current Outpatient Medications   Medication Sig Dispense Refill     Cyanocobalamin (VITAMIN B 12 PO) Take by mouth every morning       cyclobenzaprine (FLEXERIL) 5 MG tablet Take 1 tablet (5 mg) by mouth 2 times daily as needed for muscle spasms 30 tablet 3     famotidine (PEPCID) 20 MG tablet Take 1 tablet (20 mg) by mouth 2 times daily as needed (heartburn) 180 tablet 0     Fexofenadine HCl (ALLERGY 24-HR PO) Take by mouth every morning       Multiple Vitamins-Minerals (BIO-35 GLUTEN-FREE PO) Take by mouth as needed       naproxen sodium 220 MG capsule Take 220 mg by mouth 2 times daily (with meals)       pantoprazole (PROTONIX) 40 MG EC tablet Take 1 tablet (40 mg) by mouth daily (Patient taking differently: Take 40 mg by mouth every morning ) 90 tablet 0     traZODone (DESYREL) 50 MG tablet Take 50 mg by mouth as needed       TURMERIC PO Take by mouth 3 times daily       gabapentin (NEURONTIN) 300 MG capsule Take 1 capsule (300 mg) by mouth 3 times daily Start with 300 mg once daily for 3 days - then increase to 300 mg twice daily for three days, then 300 mg three times daily. (Patient taking differently: Take 300 mg by mouth 3 times daily Start with 300 mg once daily for 3 days - then increase to 300 mg twice daily for three days, then 300 mg three times daily.) 60 capsule 1     HYDROXYZINE PAMOATE PO Take 25 mg by mouth every 4  hours as needed for itching        methylPREDNISolone (MEDROL DOSEPAK) 4 MG tablet therapy pack Follow Package Directions (Patient not taking: Reported on 2021) 21 tablet 0     nicotine (NICODERM CQ) 14 MG/24HR 24 hr patch Place 1 patch onto the skin every 24 hours 14 patch 0     nicotine (NICODERM CQ) 21 MG/24HR 24 hr patch Place 1 patch onto the skin every 24 hours 42 patch 0     nicotine (NICODERM CQ) 7 MG/24HR 24 hr patch Place 1 patch onto the skin every 24 hours 14 patch 0     sertraline (ZOLOFT) 100 MG tablet          Allergies  Allergies   Allergen Reactions     Varenicline      briiares that persisted on this     Gluten Meal Nausea and Vomiting and Diarrhea     Patient states he feels like he is going to die , sometimes has blood in the vomit .     Pollen Extract Difficulty breathing     hayfever       Social History  Social History     Socioeconomic History     Marital status:      Spouse name: Not on file     Number of children: Not on file     Years of education: Not on file     Highest education level: Not on file   Occupational History     Not on file   Social Needs     Financial resource strain: Not on file     Food insecurity     Worry: Not on file     Inability: Not on file     Transportation needs     Medical: Not on file     Non-medical: Not on file   Tobacco Use     Smoking status: Former Smoker     Packs/day: 0.25     Types: Cigarettes     Quit date: 4/15/2021     Years since quittin.1     Smokeless tobacco: Former User     Types: Chew     Tobacco comment: using patch, down to 5-6cigs /day as of 2020   Substance and Sexual Activity     Alcohol use: Yes     Drug use: Not Currently     Sexual activity: Yes     Partners: Female     Birth control/protection: Patch   Lifestyle     Physical activity     Days per week: Not on file     Minutes per session: Not on file     Stress: Not on file   Relationships     Social connections     Talks on phone: Not on file     Gets together:  Not on file     Attends [employer]ism service: Not on file     Active member of club or organization: Not on file     Attends meetings of clubs or organizations: Not on file     Relationship status: Not on file     Intimate partner violence     Fear of current or ex partner: Not on file     Emotionally abused: Not on file     Physically abused: Not on file     Forced sexual activity: Not on file   Other Topics Concern     Not on file   Social History Narrative     Not on file       Family History  Family History   Problem Relation Age of Onset     Deep Vein Thrombosis (DVT) Mother      Deep Vein Thrombosis Mother      Crohn's Disease Cousin      Colon Cancer No family hx of      Irritable Bowel Syndrome No family hx of            Anesthesia Evaluation   Pt has had prior anesthetic.     History of anesthetic complications   pt reports slow to wake after hip procedure 2018. when he did wake up he had hallucinations.  He subsequently had ADRI without any issues..    ROS/MED HX  ENT/Pulmonary:     (+) tobacco use, Past use,  (-) asthma   Neurologic:  - neg neurologic ROS  (-) no seizures and no CVA   Cardiovascular:  - neg cardiovascular ROS   (+) -----No previous cardiac testing     METS/Exercise Tolerance: >4 METS    Hematologic:  - neg hematologic  ROS  (-) history of blood clots and history of blood transfusion   Musculoskeletal: Comment: Left hip AVN, s/p ADRI 2018      GI/Hepatic: Comment: Celiac disease    (+) GERD, Asymptomatic on medication,     Renal/Genitourinary:  - neg Renal ROS     Endo:  - neg endo ROS     Psychiatric/Substance Use:     (+) psychiatric history depression and other (comment) (PTSD) alcohol abuse     Infectious Disease:  - neg infectious disease ROS     Malignancy:  - neg malignancy ROS     Other:  - neg other ROS          The complete review of systems is negative other than noted in the HPI or here.      Physical Exam    Please refer to the physical examination documented by the  "anesthesiologist in the anesthesia record on the day of surgery    Constitutional: Awake, alert, cooperative, no apparent distress, and appears stated age.  Respiratory: non labored breathing    Neurologic: Awake, alert, oriented to name, place and time.    Neuropsychiatric: Calm, cooperative. Normal affect.     PRIOR LABS/DIAGNOSTIC STUDIES:  All labs and imaging personally reviewed      Labs ordered per surgeon and pendin21    MR LUMBAR SPINE W/O CONTRAST 2020   Impression: Superiorly migrating left subarticular extrusion at L5-S1  compresses on the descending cauda equina nerve roots on the left. All  of the descending nerves are compressed by the extrusion. Mild to  moderate bilateral neural foraminal stenosis at L5-S1. No significant  spinal canal or neural foraminal stenosis in the remainder of the  lumbar spine.    Outside records reviewed from: care everywhere    ASSESSMENT and PLAN  Jimmy López is a 41 year old male scheduled for Open left lumbar 5-sacral 1 hemilaminectomy, medial facetectomy, and microdiskectomy, with microscope on 21 by Dr. Araujo in treatment of lumbar disc herniation with radiculopathy, lumbar spinal stenosis without neurogenic claudication.  PAC referral for risk assessment and optimization for anesthesia:    Pre-operative considerations:  1.  Cardiac:  Functional status- METS >4.  Intermediate risk surgery with 0.4% (RCRI #) risk of major adverse cardiac event.   --denies cardiac history.  Denies any cardiac or exertional symptoms.    2.  Pulm:  Airway feasible.  DONG risk: Low.  --former smoker.  He quit smoking for above procedure.  He states he has had a couple of \"slip ups' over the last two months but has essentially been smoke free for two months.  --nicotine test ordered per Dr. Araujo.    3.  GI:  Risk of PONV score = 2.  If > 2, anti-emetic intervention recommended.  --celiac disease  --GERD, pepcid bid and pantoprazole daily.      4.  MSK:  --h/o left ADRI " for AVN 2018.  No subsequent issues    5.  Psych:  --h/o depression, anxiety, PTSD.  Was on Zoloft but has not been able to refill as he has not seen his psychiatrist recently.  He plans to restart Zoloft.   --he reports drinking 4 Truly drinks on the weekends.  Chart review indicates he may drink more.  Discussed dangers of ETOH binge drinking and withdrawal. He states that he can abstain from drinking prior to surgery. Consideration for withdrawal is warranted.    6.  Anesthesia:  --Patient had hip surgery in 2018 at [facility].  He states it was same-day surgery.  He was slow to wake and when he did wake up he had hallucinations.  He was not kept overnight.  He had subsequent left total hip arthroplasty at [facility] without any hallucinations or anesthesia complications.  I do not have these records.    VTE risk: 3% (male gender, FH of VTE)    Patient is optimized and is acceptable candidate for the proposed procedure.  No further diagnostic evaluation is needed.       Misty Rahman PA-C  Preoperative Assessment Center  [facility]  Phone: [phone]  Fax: [phone]

## 2021-06-15 NOTE — ANESTHESIA PREPROCEDURE EVALUATION
Anesthesia Pre-Procedure Evaluation    Patient: Jimmy López III   MRN: [id number] : 1980        Preoperative Diagnosis: * No surgery found *   Procedure :      Past Medical History:   Diagnosis Date     AVN (avascular necrosis of bone) (H)     left hip     Celiac disease 2020     Depression      Sciatica 2014      Past Surgical History:   Procedure Laterality Date     TOTAL HIP ARTHROPLASTY Left     2018      Allergies   Allergen Reactions     Varenicline      nighmares that persisted on this     Gluten Meal Nausea and Vomiting and Diarrhea     Patient states he feels like he is going to die , sometimes has blood in the vomit .     Pollen Extract Difficulty breathing     hayfever      Social History     Tobacco Use     Smoking status: Former Smoker     Packs/day: 0.25     Types: Cigarettes     Quit date: 4/15/2021     Years since quittin.1     Smokeless tobacco: Former User     Types: Chew     Tobacco comment: using patch, down to 5-6cigs /day as of 2020   Substance Use Topics     Alcohol use: Yes      Wt Readings from Last 1 Encounters:   20 79.8 kg (176 lb)        Anesthesia Evaluation   Pt has had prior anesthetic.     History of anesthetic complications   pt reports slow to wake after hip procedure 2018. when he did wake up he had hallucinations.  He subsequently had ADRI without any issues..    ROS/MED HX  ENT/Pulmonary:     (+) tobacco use, Past use,  (-) asthma   Neurologic:  - neg neurologic ROS  (-) no seizures and no CVA   Cardiovascular:  - neg cardiovascular ROS   (+) -----No previous cardiac testing     METS/Exercise Tolerance: >4 METS    Hematologic:  - neg hematologic  ROS  (-) history of blood clots and history of blood transfusion   Musculoskeletal: Comment: Left hip AVN, s/p DARI 2018      GI/Hepatic: Comment: Celiac disease    (+) GERD, Asymptomatic on medication,     Renal/Genitourinary:  - neg Renal ROS     Endo:  - neg endo ROS     Psychiatric/Substance Use:      (+) psychiatric history depression and other (comment) (PTSD) alcohol abuse     Infectious Disease:  - neg infectious disease ROS     Malignancy:  - neg malignancy ROS     Other:  - neg other ROS             OUTSIDE LABS:  CBC:   Lab Results   Component Value Date    WBC 5.1 11/12/2020    WBC 7.5 07/22/2018    HGB 13.8 11/12/2020    HGB 12.9 (L) 07/22/2018    HCT 42.1 11/12/2020    HCT 39.2 (L) 07/22/2018     11/12/2020     07/22/2018     BMP:   Lab Results   Component Value Date     11/12/2020     07/22/2018    POTASSIUM 4.5 11/12/2020    POTASSIUM 3.6 07/22/2018    CHLORIDE 108 11/12/2020    CHLORIDE 104 07/22/2018    CO2 29 11/12/2020    CO2 30 07/22/2018    BUN 14 11/12/2020    BUN 11 07/22/2018    CR 1.10 11/12/2020    CR 0.96 07/22/2018    GLC 91 11/12/2020    GLC 79 07/16/2020     COAGS: No results found for: PTT, INR, FIBR  POC: No results found for: BGM, HCG, HCGS  HEPATIC:   Lab Results   Component Value Date    ALBUMIN 3.8 11/12/2020    PROTTOTAL 7.3 11/12/2020    ALT 53 11/12/2020    AST 41 11/12/2020    ALKPHOS 58 11/12/2020    BILITOTAL 0.5 11/12/2020     OTHER:   Lab Results   Component Value Date    MIMI 9.7 11/12/2020    TSH 1.26 07/16/2020    CRP <2.9 11/12/2020             PAC Discussion and Assessment    ASA Classification: 2  Case is suitable for: [place]  Anesthetic techniques and relevant risks discussed: GA  Invasive monitoring and risk discussed: No    Possibility and Risk of blood transfusion discussed: Yes            PAC Resident/NP Anesthesia Assessment: Jimmy López is a 41 year old male scheduled for Open left lumbar 5-sacral 1 hemilaminectomy, medial facetectomy, and microdiskectomy, with microscope on 6/29/21 by Dr. Araujo in treatment of lumbar disc herniation with radiculopathy, lumbar spinal stenosis without neurogenic claudication.  PAC referral for risk assessment and optimization for anesthesia:    Pre-operative considerations:  1.  Cardiac:   "Functional status- METS >4.  Intermediate risk surgery with 0.4% (RCRI #) risk of major adverse cardiac event.   --denies cardiac history.  Denies any cardiac or exertional symptoms.    2.  Pulm:  Airway feasible.  DONG risk: Low.  --former smoker.  He quit smoking for above procedure.  He states he has had a couple of \"slip ups' over the last two months but has essentially been smoke free for two months.  --nicotine test ordered per Dr. Araujo.    3.  GI:  Risk of PONV score = 2.  If > 2, anti-emetic intervention recommended.  --celiac disease  --GERD, pepcid bid and pantoprazole daily.      4.  MSK:  --h/o left ADRI for AVN 2018.  No subsequent issues    5.  Psych:  --h/o depression, anxiety, PTSD.  Was on Zoloft but has not been able to refill as he has not seen his psychiatrist recently.  He plans to restart Zoloft.   --he reports drinking 4 Truly drinks on the weekends.  Chart review indicates he may drink more.  Discussed dangers of ETOH binge drinking and withdrawal.  He states that he can abstain from drinking prior to surgery. Consideration for withdrawal is warranted.    6.  Anesthesia:  --Patient had hip surgery in 2018 at [facility].  He states it was same-day surgery.  He was slow to wake and when he did wake up he had hallucinations.  He was not kept overnight.  He had subsequent left total hip arthroplasty at [facility] without any hallucinations or anesthesia complications.  I do not have these records.    VTE risk: 3% (male gender, FH of VTE)    Patient is optimized and is acceptable candidate for the proposed procedure.  No further diagnostic evaluation is needed.         **For further details of assessment, testing, and physical exam please see H and P completed on same date.          Misty Rahman PA-C, [facility]    Reviewed and Signed by PAC Mid-Level Provider/Resident  Mid-Level Provider/Resident: Misty Rahman  Date: 6/15/21                                 Misty Rahman PA-C  "

## 2021-06-15 NOTE — PROGRESS NOTES
Jimmy is a 41 year old who is being evaluated via a billable video visit.      How would you like to obtain your AVS? MyChart  HPI       Review of Systems         Objective    Vitals - Patient Reported  Pain Score: Severe Pain (7)        Physical Exam     SLIM Rm LPN

## 2021-06-18 NOTE — PROGRESS NOTES
Assessment:      Healthy male exam.    1. Well adult exam     2. Lumbar radiculopathy  Basic Metabolic Panel    HM2(CBC w/o Differential)    Hepatic Profile    MR Lumbar Spine Without Contrast    Ambulatory referral to [facility]   3. Hyperlipidemia LDL goal <130  LDL Cholesterol, Direct   4. Lumbago  Basic Metabolic Panel    HM2(CBC w/o Differential)    Hepatic Profile   5. Nicotine Dependence  buPROPion (WELLBUTRIN SR) 150 MG 12 hr tablet          Plan:       Get lab as above.   Recommend he have MRI of the lumbar spine because it is been 4 years since last imaging.  Refer to spine clinic for consideration of epidural steroid injection for that left lumbar radiculopathy.  Definitely recommend discontinuing smoking.  He is willing to try the bupropion and will take 1 pill a day for 3 days, then 1 pill twice a day and then after 1 week stop smoking and stay on that for 3 months but call or follow-up if any difficulties in terms of smoking cessation.    Subjective:      Jimmy López III is a 38 y.o. male who presents for an annual exam. The patient reports that there is not domestic violence in his life.  He notes he has had low back pain trouble for about 10 years.  He has had previous steroid injection at spine clinic more than 4 years ago.  He recently has seen a chiropractor for 1 visit who did manipulation of the lower back and recommended he be seen for at least 10 times and had recommended that he start oral steroids.  That was why he had initially called and then we have asked him to come in for visit.  He notes that he has had left leg pain that radiates down to the foot has been persisting.  He is not taking any current medication for it.  He has not had any right leg pain.  He denies any trouble with urination or bowel movements except that he does have some loose stools up to 2 times a week that can be even 6-7 times on a given day.  No blood in the stool.  No fever nausea or vomiting.  He does smoke  1 pack per day.  He does drink 6-7 drinks of alcohol mostly on the weekend.  He has a history of DWI ×2 in the past with the last in 2009 which was after he had gotten back from [place].  He does not drink during the week and is not having any more than 6-7 on the weekend.  He had taken Chantix once to try to stop smoking but that gave him bad dreams and he would not want to take that again.    Healthy Habits:   Regular Exercise: Yes  Sunscreen Use: No  Healthy Diet: Yes  Dental Visits Regularly: Yes  Seat Belt: Yes  Sexually active: Yes  Monthly Self Testicular Exams:  No  Hemoccults: No  Flex Sig: No  Colonoscopy: No  Lipid Profile: Yes  Glucose Screen: Yes  Prevention of Osteoporosis: No  Last Dexa: No  Guns at Home:  No      Immunization History   Administered Date(s) Administered     Tdap 10/01/2005, 09/21/2016     Immunization status: up to date and documented.    No exam data present   Please note that the below list of medications is after I added the bupropion today.  He is seen by the [facility] for management of posttraumatic stress disorder and depression and they have been prescribing the sertraline.  His PHQ-9 score today is 18, and he will be following up with [facility].    Current Outpatient Prescriptions   Medication Sig Dispense Refill     sertraline (ZOLOFT) 100 MG tablet Take 150 mg by mouth daily.        buPROPion (WELLBUTRIN SR) 150 MG 12 hr tablet Take 1 tablet (150 mg total) by mouth 2 (two) times a day. 60 tablet 2     No current facility-administered medications for this visit.      Past Medical History:   Diagnosis Date     Anxiety      Depression     PTSD and depression from being in the service.  Deployed [id number]     No past surgical history on file.  Review of patient's allergies indicates no known allergies.  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  Since then he was diagnosed with PTSD, depression and anxiety.           Review of Systems  Review of Systems   All other review of systems are negative.          Objective:     Vitals:    05/21/18 1338   BP: 100/58   Pulse: 98   Resp: 12   Temp: 98.2  F (36.8  C)   TempSrc: Oral   SpO2: 98%   Weight: 171 lb (77.6 kg)   Height: 5' 11.75\" (1.822 m)     Body mass index is 23.35 kg/(m^2).    Physical  Physical Exam   Alert male.  External ears and TMs normal.  Eyes show pupils equal round and react to light and accommodation.  Nose and oropharynx negative.  Neck supple without adenopathy or thyromegaly.  Lungs clear.  Heart regular rate and rhythm without murmur.  Abdomen shows no masses tenderness or hepatosplenomegaly.  Genitalia normal with normal testes.  No hernia.  External anus unremarkable.  He does have definite bilateral pes planus.  Skin is not remarkable.  He definitely ambulates slowly and carefully given that quick movements with his back aggravate his back and left leg pain.  Is alert with clear speech.        "

## 2021-06-18 NOTE — PROGRESS NOTES
ASSESSMENT:     Diagnoses and all orders for this visit:    Lumbalgia  -     OPS TFESI Lumbar Sacral Unilateral; Future; Expected date: 5/22/18    Lumbar radiculitis    Lumbar disc herniation    Myofascial pain    Sleep disturbance    Avascular necrosis of bone of hip, left            Jimmy López III is a 38 y.o. male  with a BMI of Body mass index is 23.27 kg/(m^2). who presents today in consultation at the request of Cruzito Peralta MD  for new patient evaluation of chronic bilateral low back pain with worsening of radicular pain to the left lower extremity in the S1 nerve distribution.  Patient symptoms are likely due to the broad-based disc bulge at the L5-S1 leading to narrowing of the left lateral recess and impinging on the left S1 nerve root that is probably contributing to his current symptoms.  There is also small broad disc bulge at the L4-L5 causing narrowing of the lateral recess and contacting the bilateral traversing L5 nerve root that could also contribute to the left radicular pain to the left lower extremity.     Patient has left groin pain is likely due to the left femoral head avascular necrosis seen on the lumbar MRI on May 21, 2018.  I placed  referral to orthopedic consultation at [facility] to consult with Dr. Frost for evaluation of the left femoral head avascular necrosis.        SOPHIA: 50  WHO-5:  10    PLAN:  A shared decision making model was used.  The patient's values and choices were respected.  The following represents what was discussed and decided upon by the physician and the patient.      1.  DIAGNOSTIC TESTS:  The lumbar MRI showed broad-based disc bulge at the L5-S1 leading to narrowing of the left lateral recess and impinging on the left S1 nerve root that is probably contributing to his current symptoms.  There is also small broad disc bulge at the L4-L5 causing narrowing of the lateral recess and contacting the bilateral traversing L5 nerve root that could  contribute to the left radicular pain to the left lower extremity.  Patient has avascular necrosis of the left femoral head.  I reviewed the lumbar MRI imaging and the report with the patient today.  He verbalizes understanding.  2.  PHYSICAL THERAPY: Patient will benefit from physical therapy program for the lumbar spine and the left hip after his evaluation for the left hip avascular necrosis with a hip provider.  3.  MEDICATIONS: Patient will continue on over-the-counter medication as needed for pain.  4.  INTERVENTIONS: I recommend left L5-S1 transforaminal epidural steroid injection to help with the radicular pain to the left lower extremity.  Patient will follow up with a hip provider first for his left groin pain, thereafter, he will follow-up with us to discuss recommendation from the hip provider, and the possibility of proceeding with the procedure.  5.  PATIENT EDUCATION: I thoroughly discussed the plan with the patient today.  He verbalizes understanding and agrees with the plan.      FOLLOW-UP:   Patient will follow up with me in 4 weeks.  All questions were answered.      SUNNY Patton, MPA-C          SUBJECTIVE:  Jimmy López III  Is a 38 y.o. male who presents today for new patient evaluation of low back pain.  Patient reports bilateral low back pain and left-sided radicular pain to the left gluteal, left anterior lateral anterior thigh, left groin pain, left lateral posterior calf and the plantar aspect of the left foot.  Patient stated that his symptoms has been present since 2014 when he saw Dr. Sipple.  At that time he received left L5-S1 transforaminal epidural steroid injection that helped with his symptoms, however she did not have complete resolution of his symptoms.  Patient underwent physical therapy at that time.  Patient was in the [employer] traveling overseas.  He had worsening of his symptoms in the last several months.  At this time patient reports 7-8 out of 10 intensity pain in  the lower back with radicular pain to the left lower extremity, and left groin pain.  His symptoms are aggravated by bearing weight on his left side, standing up from a sitting position, walking and rates it at 10 out of 10 intensity on its worse.  His symptoms are alleviated by taking over-the-counter medication as ibuprofen 200 mg,  as needed for pain.     Patient had lumbar MRI that was done in May 21 of 2018.    Patient states that he has difficulty bearing weight on his left foot due to pain in the left lower back and the left groin.  Patient denies history of back surgeries,, recent trauma to the lower back, motor vehicle accident. Patient denies urinary or bowel incontinence, unintentional weight loss, saddle anesthesia, fever or chills, balance difficulties.    Medications:    Current Outpatient Prescriptions   Medication Sig Dispense Refill     buPROPion (WELLBUTRIN SR) 150 MG 12 hr tablet Take 1 tablet (150 mg total) by mouth 2 (two) times a day. 60 tablet 2     sertraline (ZOLOFT) 100 MG tablet Take 150 mg by mouth daily.        No current facility-administered medications for this encounter.        Allergies: No Known Allergies    PMH:    Past Medical History:   Diagnosis Date     Anxiety      Depression     PTSD and depression from being in the service.  Deployed [id number]       PSH: Past surgical history reviewed and negative.    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  Since then he was diagnosed with PTSD, depression and anxiety.           ROS: Bilateral low back pain with left groin pain, and radicular pain to the left lower extremity.  Specifically negative for bowel/bladder dysfunction, fevers,chills, appetite changes, unexplained weight loss.   Otherwise 13 systems reviewed are negative.  Please see the patient's intake questionnaire from today for details.      OBJECTIVE:  PHYSICAL EXAMINATION:    CONSTITUTIONAL:  Vital signs as above.  No acute distress.  The patient is well nourished and well groomed.  PSYCHIATRIC:  The patient is awake, alert, oriented to person, place, time and answering questions appropriately with clear speech.    SKIN:  Skin over the face, bilateral lower extremities, and posterior torso is clean, dry, intact without rashes.    GAIT:   Gait is antalgic.   The patient is un able to heel and toe walk due to severe low back pain.  STANDING EXAMINATION: Patient has significant tenderness at the L4-L5, L5-S1 especially on the left L4-L5, L5-S1.  Mild tenderness at the SI joint bilaterally.  MUSCLE STRENGTH:  The patient has 5/5 strength for the bilateral hip flexors, knee flexors/extensors, ankle dorsiflexors/plantar flexors, great toe extensors, ankle evertors/invertors.  NEUROLOGICAL:  2/4 patellar, medial hamstring, and achilles reflexes bilaterally.  Negative Babinski's bilaterally.  No ankle clonus bilaterally. Sensation to light touch is decreased in the left L5, S1.  Sensation to light touch is intact in the right L4, L5, and S1 dermatomes.  VASCULAR:  2/4  pulses bilaterally.  Bilateral lower extremities are warm.  There is no pitting edema of the bilateral lower extremities.  LYMPH NODES:  No palpable or tender  inguinal/popliteal lymph nodes.  MUSCULOSKELETAL: Positive left straight leg raise.  Negative right straight leg raise.  Positive left hip impingement.  Unable to perform Jean-Paul test due to left hip pain.  Negative right Jean-Paul test.       RESULTS:      [facility]  MR LUMBAR SPINE WO CONTRAST  5/21/2018 9:30 PM      INDICATION: Low back pain, >6 wks / red flag(s) / radiculopathy  TECHNIQUE: Without IV contrast.  CONTRAST: None  COMPARISON: None.     FINDINGS: Nomenclature is based on 5 lumbar type vertebral bodies. The conus ends at L1-L2. There is mild dextrocurvature of the lumbar spine. 2 mm retrolisthesis of L4 on L5 and L5 on S1. Vertebral body heights are maintained. Nonpathologic marrow   signal. Modic type II changes at L5-S1. No abnormal marrow edema. The paraspinal soft tissues are grossly within normal limits. Normal diameter of the distal abdominal aorta. Left femoral head avascular necrosis. The visualized bony pelvis is grossly   within normal limits. No MRI evidence for pars defects.      T12-L1: Normal disc height and signal. No herniation. No facet arthropathy. No spinal canal stenosis. No right neural foraminal stenosis. No left neural foraminal stenosis.     L1-L2: Normal disc height and signal. No herniation. Mild bilateral facet arthropathy. No spinal canal stenosis. No right neural foraminal stenosis. No left neural foraminal stenosis.     L2-L3: Normal disc height and signal. Small broad-based disc bulge. Mild bilateral facet arthropathy. No spinal canal stenosis. No right neural foraminal stenosis. No left neural foraminal stenosis.     L3-L4: Normal disc height and signal. Small broad-based disc bulge. Mild bilateral facet arthropathy. No spinal canal stenosis. No right neural foraminal stenosis. No left neural foraminal stenosis.     L4-L5: Mild intervertebral disc height loss. Loss of the normal T2 signal within the disc. Small broad-based disc bulge superimposed small central  disc protrusion and annular fissure. Mild to moderate bilateral facet arthropathy. Narrowing of the lateral   recesses. Contact with the bilateral traversing L5 nerve roots in the lateral recesses. No spinal canal narrowing. Mild right neuroforaminal narrowing. Mild left neuroforaminal narrowing.     L5-S1: Mild to moderate intervertebral disc height loss. Loss of the normal T2 signal within the disc. There is a broad-based disc bulge with superimposed moderate size inferiorly directed left paracentral disc extrusion. Mild bilateral facet   arthropathy. Narrowing of the left lateral recess and impingement of the traversing left S1 nerve root in the lateral recess. No spinal canal narrowing. Mild right neuroforaminal narrowing. Mild left neuroforaminal narrowing.     IMPRESSION:   CONCLUSION:  1.  Narrowing of the left lateral recess at L5-S1 by inferiorly directed left paracentral disc extrusion with mild bilateral facet arthropathy. Impingement of the traversing left S1 nerve root in the left lateral recess of L5-S1.  2.  Narrowing of the lateral recesses at L4-L5 and probable contact with the bilateral traversing L5 nerve roots in the lateral recesses.  3.  No high-grade spinal canal narrowing.  4.  Mild bilateral neuroforaminal narrowing at L4-L5 and L5-S1.  5.  Avascular necrosis of the left femoral head.

## 2021-06-18 NOTE — PROGRESS NOTES
Preoperative Exam    Scheduled Procedure: Hip core Decompression  Surgery Date:  06/28/2018  Surgery Location: [facility] [phone]    Surgeon:  Dr. Barba    Assessment/Plan:     1. Encounter for preoperative examination for general surgical procedure  HM2(CBC w/o Differential)    Basic Metabolic Panel   2. Avascular necrosis of hip, left (H)         1. Encounter for preoperative examination for general surgical procedure    - HM2(CBC w/o Differential)  - Basic Metabolic Panel    2. Avascular necrosis of hip, left (H)    AVS printed, given to patient      Surgical Procedure Risk: Low (reported cardiac risk generally < 1%)  Have you had prior anesthesia?: No  Have you or any family members had a previous anesthesia reaction:  No  Do you or any family members have a history of a clotting or bleeding disorder?: No  Cardiac Risk Assessment: no increased risk for major cardiac complications    Patient approved for surgery with general or local anesthesia.    Please Note:  None    Functional Status: Independent  Patient plans to recover at home with family.     Subjective:      Jimmy López III is a 38 y.o. male who presents for a preoperative consultation.  Patient evaluated by [facility] on June 13, 2018 for his ongoing left hip pain of 10 years.  He was found to have left hip avascular necrosis along with low back pain and left lower extremity radiculopathy for the last 10 years.  He was evaluated at the [facility] and underwent a lumbar MRI.  6 years [facility] since [facility] does have  status from the [employer].  At that time an x-ray of the hip was performed but did not show any significant changes, therefore his claim to have further workup done on the left hip was declined.  The MRI performed by [facility] did show some low-grade disc herniation at the L4-L5 and L5-S1 regions.  It also confirmed his avascular  necrosis of the left hip which then the patient was sent to [facility] for further evaluation.  Over the last 18 months, his left hip pain has become quite severe.  Most of his pain is located in the left groin and is aggravated by any form of movement.  He does have a significant limp and is using crutches today.  His pain continues to be constant and he describes it as a sharp, shooting, stabbing, achy and throbbing sensation.  He does admit to numbness and tingling of the left lower extremity. He states that he is not able to tolerate any position such as sitting, walking or laying for more than 10 minutes before he has to change positions again.  He has tried ice packs to the area of discomfort, he does stretch as tolerated, he is no longer taking Tylenol or Ibuprofen regularly because he felt it was not helpful.  He is rating his left hip and low back pain a 10 out of 10 today.  He has been sleeping poorly for the last 18 months, he is only able to sleep on the right side.  This has caused him to become more irritable, he is working with his primary care provider on mood stabilization.  Medications currently include bupropion and sertraline. He is currently not taking any sort of opioid therapy.  He is planning on having 2 weeks off of work, he works in auditing.  On his third week of postop recovery he plans on working from home and then hopefully will return to work on week 4 full time.  He has discussed plans for his low back pain management with his orthopedic specialist, he is trying to avoid having low back surgery, sounds like the plan is for some sort of cortisone injection.  Lab work ordered, vital signs are stable today, AVS printed and given to patient.    All other systems reviewed and are negative, other than those listed in the HPI.    Pertinent History  Do you have difficulty breathing or chest pain after walking up a flight of stairs: No  History of obstructive sleep apnea: No  Steroid use in  the last 6 months: No  Frequent Aspirin/NSAID use: No  Prior Blood Transfusion: No  Prior Blood Transfusion Reaction: No  If for some reason prior to, during or after the procedure, if it is medically indicated, would you be willing to have a blood transfusion?:  There is no transfusion refusal.    Current Outpatient Prescriptions   Medication Sig Dispense Refill     buPROPion (WELLBUTRIN SR) 150 MG 12 hr tablet Take 1 tablet (150 mg total) by mouth 2 (two) times a day. 60 tablet 2     sertraline (ZOLOFT) 100 MG tablet Take 150 mg by mouth daily.        No current facility-administered medications for this visit.         No Known Allergies    Patient Active Problem List   Diagnosis     Nicotine Dependence     Urinary Frequency More Than Twice At Night (Nocturia)     Lower Back Pain     Sciatica     Diarrhea     Lumbar herniated disc     PTSD (post-traumatic stress disorder)     Depression     Anxiety     Hyperlipidemia LDL goal <130     Avascular necrosis of hip, left (H)       Past Medical History:   Diagnosis Date     Anxiety      Depression     PTSD and depression from being in the service.  Deployed [id number]       No past surgical history on file.    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  Since then he was diagnosed with PTSD, depression and anxiety.           Patient Care Team:  Cruzito Gilman MD as PCP - General          Objective:  "    Vitals:    06/26/18 0949   BP: 100/58   Pulse: 86   Resp: 12   Temp: 98  F (36.7  C)   SpO2: 95%   Weight: 174 lb (78.9 kg)   Height: 5' 11.5\" (1.816 m)   PainSc: 10-Worst pain ever   PainLoc: Hip         Review of Systems     Denies fever, chills, visual changes, fatigue, nasal congestion, rhinorrhea, ear pain, or discharge, sore throat, swollen glands, abdominal pain, cough, shortness of breath, chest pain, weight change, change in bowel habits, melena, rectal bleeding, dysuria, frequency, urgency, hematuria, polyuria, polydipsia, polyphagia, swelling or erythema, rash     Positive: left hip pain with swelling, weakness, numbness and tingling, insomnia, irritable, seasonal allergies   Objective:         /58  Pulse 86  Temp 98  F (36.7  C)  Resp 12  Ht 5' 11.5\" (1.816 m)  Wt 174 lb (78.9 kg)  SpO2 95%  BMI 23.93 kg/m2     Physical Exam:  General Appearance: Alert, cooperative, no distress, appears stated age  Head: Normocephalic, without obvious abnormality, atraumatic  Eyes: PERRL, conjunctiva/corneas clear, EOM's intact, wearing glasses  Ears: Normal TM's and external ear canals, both ears  Nose: Nares normal, septum midline,mucosa normal, no drainage  Throat: Lips, mucosa, and tongue normal; teeth and gums normal  Neck: Supple, symmetrical, trachea midline, no adenopathy;  thyroid: not enlarged, symmetric, no tenderness/mass/nodules; no carotid bruit or JVD  Back: Symmetric, no curvature, ROM normal, no CVA tenderness, incresed low back pain with forward flexion, hyperextension, spinal rotation left and right and right lateral extension.  Lungs: Clear to auscultation bilaterally, respirations unlabored  Heart: Regular rate and rhythm, S1 and S2 normal, no murmur, rub, or gallop  Abdomen: Soft, non-tender, bowel sounds active all four quadrants,  no masses, no organomegaly  Extremities: no cyanosis, mild amount of edema in the left hip.  Increased left hip pain with palpation, abduction, " abduction, internal and external rotation of the leg. Pain with palpation of left upper buttock  Skin: Skin color, texture, turgor normal, no rashes or lesions, warm and dry  Lymph nodes: Cervical, supraclavicular, and axillary nodes normal  Neurologic: Normal, DTRs intact, equal  strength, no drift, face symmetrical, negative Romberg             Patient Instructions     Hold all supplements, aspirin and NSAIDs for 7 days prior to surgery.  Follow your surgeon's direction on when to stop eating and drinking prior to surgery.  Your surgeon will be managing your pain after your surgery.    Remove all jewelry and metal piercings before your surgery.           Labs:  Recent Results (from the past 24 hour(s))   HM2(CBC w/o Differential)    Collection Time: 06/26/18 10:16 AM   Result Value Ref Range    WBC 5.6 4.0 - 11.0 thou/uL    RBC 5.29 4.40 - 6.20 mill/uL    Hemoglobin 15.1 14.0 - 18.0 g/dL    Hematocrit 45.6 40.0 - 54.0 %    MCV 86 80 - 100 fL    MCH 28.6 27.0 - 34.0 pg    MCHC 33.1 32.0 - 36.0 g/dL    RDW 12.3 11.0 - 14.5 %    Platelets 298 140 - 440 thou/uL    MPV 7.4 7.0 - 10.0 fL   Basic Metabolic Panel    Collection Time: 06/26/18 10:16 AM   Result Value Ref Range    Sodium 140 136 - 145 mmol/L    Potassium 4.5 3.5 - 5.0 mmol/L    Chloride 107 98 - 107 mmol/L    CO2 24 22 - 31 mmol/L    Anion Gap, Calculation 9 5 - 18 mmol/L    Glucose 74 70 - 125 mg/dL    Calcium 10.2 8.5 - 10.5 mg/dL    BUN 18 8 - 22 mg/dL    Creatinine 1.07 0.70 - 1.30 mg/dL    GFR MDRD Af Amer >60 >60 mL/min/1.73m2    GFR MDRD Non Af Amer >60 >60 mL/min/1.73m2       Immunization History   Administered Date(s) Administered     Tdap 10/01/2005, 09/21/2016           Electronically signed by Whitney Trujillo NP 06/26/18 9:44 AM

## 2021-06-21 NOTE — PROGRESS NOTES
Preoperative Exam    Scheduled Procedure: Left Hip replacement  Surgery Date:  12/19/2018  Surgery Location: [facility]    Surgeon:  Dr. Reyes    Assessment/Plan:     1. Preop cardiovascular exam  Basic Metabolic Panel    HM2(CBC w/o Differential)    Electrocardiogram Perform - Clinic   2. Avascular necrosis of hip, left (H)  Basic Metabolic Panel    HM2(CBC w/o Differential)   3. Nicotine Dependence  Electrocardiogram Perform - Clinic    buPROPion (WELLBUTRIN SR) 150 MG 12 hr tablet   4. PTSD (post-traumatic stress disorder)     5. Hyperlipidemia LDL goal <130  Electrocardiogram Perform - Clinic     Assuming lab is okay, he will be cleared for surgery as scheduled.  I definitely recommend smoking cessation here to reduce risk of postoperative pulmonary complications.  He understands and agrees.  I also recommended for the long-term that he slightly moderate alcohol intake and not have more than average of 1/day.  He is planning on getting a large tattoo on his back and I definitely recommend that he not have that done before the surgery and he understands and agrees.  He notes he will postpone that.      Surgical Procedure Risk: Low (reported cardiac risk generally < 1%)  Have you had prior anesthesia?: Yes  Have you or any family members had a previous anesthesia reaction:  Yes: had flashback from [place] & hard to wake up   Do you or any family members have a history of a clotting or bleeding disorder?: No  Cardiac Risk Assessment: no increased risk for major cardiac complications    Patient approved for surgery with general or local anesthesia.    Please Note:  no special equipment considerations    Functional Status: Independent  Patient plans to recover at home with family.     Subjective:      Jimmy López III is a 38 y.o. male who presents for a preoperative consultation.  Has known left hip avascular necrosis. Had hip arthroscopy and chondroplasty done in July of this year but having  sufficient difficulty with left hip pain that it is appropriate to proceed with the hip replacement surgery.  He had previously used bupropion for smoking cessation, he tolerated that medication, was able to take it when he was on the sertraline and it did help him stop smoking.  He did resume smoking in the last 2-3 months with situational stress.  Is interested in stopping smoking.  Has a history of the posttraumatic stress disorder, and is on sertraline and mirtazapine for that as well as depression and is treated by Dr. Lea Fernandez at the [facility] in [place].    All other systems reviewed and are negative, other than those listed in the HPI.    Pertinent History  Do you have difficulty breathing or chest pain after walking up a flight of stairs: No  History of obstructive sleep apnea: No  Steroid use in the last 6 months: No  Frequent Aspirin/NSAID use: No  Prior Blood Transfusion: No  Prior Blood Transfusion Reaction: N/A  If for some reason prior to, during or after the procedure, if it is medically indicated, would you be willing to have a blood transfusion?:  There is no transfusion refusal.    Current Outpatient Medications   Medication Sig Dispense Refill     mirtazapine (REMERON) 15 MG tablet   2     sertraline (ZOLOFT) 100 MG tablet Take 150 mg by mouth daily.        buPROPion (WELLBUTRIN SR) 150 MG 12 hr tablet Take 1 tablet (150 mg total) by mouth 2 (two) times a day. 60 tablet 2     No current facility-administered medications for this visit.         Allergies   Allergen Reactions     Chantix [Varenicline]      briiares that persisted on this       Patient Active Problem List   Diagnosis     Nicotine Dependence     Lower Back Pain     Sciatica     Lumbar herniated disc     PTSD (post-traumatic stress disorder)     Depression     Anxiety     Hyperlipidemia LDL goal <130     Avascular necrosis of hip, left (H)       Past Medical History:   Diagnosis Date     Anxiety      Depression      PTSD and depression from being in the service.  Deployed [id number]       Past Surgical History:   Procedure Laterality Date     left hip arthroscopic chondroplasty Left 07/19/2018    diagnostic arthroscopy, chondroplasty,synovectomy  see media section for details       Social History     Socioeconomic History     Marital status:      Spouse name: Not on file     Number of children: 4     Years of education: Not on file     Highest education level: Not on file   Social Needs     Financial resource strain: Not on file     Food insecurity - worry: Not on file     Food insecurity - inability: Not on file     Transportation needs - medical: Not on file     Transportation needs - non-medical: Not on file   Occupational History     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  Since then he was diagnosed with PTSD, depression and anxiety.       Patient Care Team:  Cruzito Gilman MD as PCP - General          Objective:     Vitals:    11/21/18 0840   BP: 104/76   Pulse: 93   Resp: 18   Temp: 97.8  F (36.6  C)   TempSrc: Oral   SpO2: 98%   Weight: 177 lb (80.3 kg)   Height: 6' (1.829 m)         Physical Exam:  Physical Exam  Alert male.  Head normocephalic.  Extremities and TMs are normal.  Eyes show pupils equal round and reactive to light and accommodation.  Nose and oropharynx are unremarkable.  Neck supple without adenopathy or thyromegaly.  Lungs clear to auscultation.  Heart regular rate and rhythm without murmur.  Abdomen shows no masses  tenderness or hepatosplenomegaly.  Genitalia show normal testes with no hernia.  Rectal examination shows normal smooth prostate, did have rectal sphincter spasm with exam.  Extremities show pes planus, he has the antalgic gait favoring the left hip consistent with his known avascular necrosis.  He is alert with clear speech.  There are no Patient Instructions on file for this visit.    EKG: Normal EKG.  Normal sinus rhythm.    Labs:  Labs pending at this time.  Results will be reviewed when available.    Immunization History   Administered Date(s) Administered     Influenza, inj, historic,unspecified 09/28/2018     Tdap 10/01/2005, 09/21/2016           Electronically signed by Cruzito Gilman MD 11/21/18 8:31 AM

## 2021-06-25 ENCOUNTER — CARE COORDINATION (OUTPATIENT)
Dept: NEUROSURGERY | Facility: CLINIC | Age: 41
End: 2021-06-25

## 2021-06-25 NOTE — PROGRESS NOTES
Writer left voice mail for patient. Patient did not complete labs and Covid test scheduled for 06/25/21 for   Surgical date 06/29/21.    Writer provided her direct dial on voice mail.     Rossr contacted Rainer Wisdom RNCC to inform of the above.     Tracie Phoenix LPN  Neurosurgery

## 2021-06-26 DIAGNOSIS — Z11.59 ENCOUNTER FOR SCREENING FOR OTHER VIRAL DISEASES: ICD-10-CM

## 2021-06-26 LAB
LABORATORY COMMENT REPORT: NORMAL
SARS-COV-2 RNA RESP QL NAA+PROBE: NEGATIVE
SARS-COV-2 RNA RESP QL NAA+PROBE: NORMAL
SPECIMEN SOURCE: NORMAL
SPECIMEN SOURCE: NORMAL

## 2021-06-26 PROCEDURE — U0005 INFEC AGEN DETEC AMPLI PROBE: HCPCS | Performed by: NEUROLOGICAL SURGERY

## 2021-06-26 PROCEDURE — U0003 INFECTIOUS AGENT DETECTION BY NUCLEIC ACID (DNA OR RNA); SEVERE ACUTE RESPIRATORY SYNDROME CORONAVIRUS 2 (SARS-COV-2) (CORONAVIRUS DISEASE [COVID-19]), AMPLIFIED PROBE TECHNIQUE, MAKING USE OF HIGH THROUGHPUT TECHNOLOGIES AS DESCRIBED BY CMS-2020-01-R: HCPCS | Performed by: NEUROLOGICAL SURGERY

## 2021-06-28 ENCOUNTER — ANESTHESIA EVENT (OUTPATIENT)
Dept: SURGERY | Facility: CLINIC | Age: 41
End: 2021-06-28
Payer: COMMERCIAL

## 2021-06-28 DIAGNOSIS — Z01.818 PRE-OP EVALUATION: ICD-10-CM

## 2021-06-28 LAB
ABO + RH BLD: NORMAL
ABO + RH BLD: NORMAL
APTT PPP: 24 SEC (ref 22–37)
BLD GP AB SCN SERPL QL: NORMAL
BLOOD BANK CMNT PATIENT-IMP: NORMAL
ERYTHROCYTE [DISTWIDTH] IN BLOOD BY AUTOMATED COUNT: 13.9 % (ref 10–15)
HCT VFR BLD AUTO: 46.8 % (ref 40–53)
HGB BLD-MCNC: 15.5 G/DL (ref 13.3–17.7)
INR PPP: 0.85 (ref 0.86–1.14)
MCH RBC QN AUTO: 28.3 PG (ref 26.5–33)
MCHC RBC AUTO-ENTMCNC: 33.1 G/DL (ref 31.5–36.5)
MCV RBC AUTO: 85 FL (ref 78–100)
PLATELET # BLD AUTO: 325 10E9/L (ref 150–450)
RBC # BLD AUTO: 5.48 10E12/L (ref 4.4–5.9)
SPECIMEN EXP DATE BLD: NORMAL
WBC # BLD AUTO: 6.7 10E9/L (ref 4–11)

## 2021-06-28 PROCEDURE — 36415 COLL VENOUS BLD VENIPUNCTURE: CPT | Performed by: NEUROLOGICAL SURGERY

## 2021-06-28 PROCEDURE — 85730 THROMBOPLASTIN TIME PARTIAL: CPT | Performed by: NEUROLOGICAL SURGERY

## 2021-06-28 PROCEDURE — 80307 DRUG TEST PRSMV CHEM ANLYZR: CPT | Mod: 90 | Performed by: NEUROLOGICAL SURGERY

## 2021-06-28 PROCEDURE — 86901 BLOOD TYPING SEROLOGIC RH(D): CPT | Performed by: NEUROLOGICAL SURGERY

## 2021-06-28 PROCEDURE — 85610 PROTHROMBIN TIME: CPT | Performed by: NEUROLOGICAL SURGERY

## 2021-06-28 PROCEDURE — 86850 RBC ANTIBODY SCREEN: CPT | Performed by: NEUROLOGICAL SURGERY

## 2021-06-28 PROCEDURE — 85027 COMPLETE CBC AUTOMATED: CPT | Performed by: NEUROLOGICAL SURGERY

## 2021-06-28 PROCEDURE — 86900 BLOOD TYPING SEROLOGIC ABO: CPT | Performed by: NEUROLOGICAL SURGERY

## 2021-06-28 PROCEDURE — 80048 BASIC METABOLIC PNL TOTAL CA: CPT | Performed by: NEUROLOGICAL SURGERY

## 2021-06-28 PROCEDURE — 99000 SPECIMEN HANDLING OFFICE-LAB: CPT | Performed by: NEUROLOGICAL SURGERY

## 2021-06-29 ENCOUNTER — ANESTHESIA (OUTPATIENT)
Dept: SURGERY | Facility: CLINIC | Age: 41
End: 2021-06-29
Payer: COMMERCIAL

## 2021-06-29 ENCOUNTER — HOSPITAL ENCOUNTER (OUTPATIENT)
Facility: CLINIC | Age: 41
Discharge: HOME | End: 2021-06-29
Attending: NEUROLOGICAL SURGERY | Admitting: NEUROLOGICAL SURGERY
Payer: COMMERCIAL

## 2021-06-29 ENCOUNTER — APPOINTMENT (OUTPATIENT)
Dept: GENERAL RADIOLOGY | Facility: CLINIC | Age: 41
End: 2021-06-29
Attending: NEUROLOGICAL SURGERY
Payer: COMMERCIAL

## 2021-06-29 VITALS
HEART RATE: 81 BPM | WEIGHT: 170.64 LBS | RESPIRATION RATE: 16 BRPM | BODY MASS INDEX: 23.11 KG/M2 | SYSTOLIC BLOOD PRESSURE: 126 MMHG | DIASTOLIC BLOOD PRESSURE: 84 MMHG | OXYGEN SATURATION: 100 % | TEMPERATURE: 97.5 F | HEIGHT: 72 IN

## 2021-06-29 DIAGNOSIS — M51.16 LUMBAR DISC HERNIATION WITH RADICULOPATHY: ICD-10-CM

## 2021-06-29 DIAGNOSIS — M48.061 SPINAL STENOSIS OF LUMBAR REGION WITHOUT NEUROGENIC CLAUDICATION: ICD-10-CM

## 2021-06-29 LAB
ANION GAP SERPL CALCULATED.3IONS-SCNC: 2 MMOL/L (ref 3–14)
BUN SERPL-MCNC: 16 MG/DL (ref 7–30)
CALCIUM SERPL-MCNC: 10.1 MG/DL (ref 8.5–10.1)
CHLORIDE SERPL-SCNC: 103 MMOL/L (ref 94–109)
CO2 SERPL-SCNC: 30 MMOL/L (ref 20–32)
CREAT SERPL-MCNC: 1.33 MG/DL (ref 0.66–1.25)
GFR SERPL CREATININE-BSD FRML MDRD: 66 ML/MIN/{1.73_M2}
GLUCOSE BLDC GLUCOMTR-MCNC: 114 MG/DL (ref 70–99)
GLUCOSE SERPL-MCNC: 139 MG/DL (ref 70–99)
POTASSIUM SERPL-SCNC: 4.7 MMOL/L (ref 3.4–5.3)
SODIUM SERPL-SCNC: 135 MMOL/L (ref 133–144)

## 2021-06-29 PROCEDURE — 999N000180 XR SURGERY CARM FLUORO LESS THAN 5 MIN: Mod: TC

## 2021-06-29 PROCEDURE — 250N000011 HC RX IP 250 OP 636: Performed by: NURSE ANESTHETIST, CERTIFIED REGISTERED

## 2021-06-29 PROCEDURE — 258N000003 HC RX IP 258 OP 636: Performed by: NURSE ANESTHETIST, CERTIFIED REGISTERED

## 2021-06-29 PROCEDURE — 82962 GLUCOSE BLOOD TEST: CPT

## 2021-06-29 PROCEDURE — 250N000025 HC SEVOFLURANE, PER MIN: Performed by: NEUROLOGICAL SURGERY

## 2021-06-29 PROCEDURE — 360N000084 HC SURGERY LEVEL 4 W/ FLUORO, PER MIN: Performed by: NEUROLOGICAL SURGERY

## 2021-06-29 PROCEDURE — 272N000001 HC OR GENERAL SUPPLY STERILE: Performed by: NEUROLOGICAL SURGERY

## 2021-06-29 PROCEDURE — 250N000024 HC ISOFLURANE, PER MIN: Performed by: NEUROLOGICAL SURGERY

## 2021-06-29 PROCEDURE — 710N000010 HC RECOVERY PHASE 1, LEVEL 2, PER MIN: Performed by: NEUROLOGICAL SURGERY

## 2021-06-29 PROCEDURE — 999N000141 HC STATISTIC PRE-PROCEDURE NURSING ASSESSMENT: Performed by: NEUROLOGICAL SURGERY

## 2021-06-29 PROCEDURE — 272N000004 HC RX 272: Performed by: NEUROLOGICAL SURGERY

## 2021-06-29 PROCEDURE — 250N000009 HC RX 250: Performed by: NURSE ANESTHETIST, CERTIFIED REGISTERED

## 2021-06-29 PROCEDURE — 250N000011 HC RX IP 250 OP 636: Performed by: NEUROLOGICAL SURGERY

## 2021-06-29 PROCEDURE — 250N000011 HC RX IP 250 OP 636: Performed by: ANESTHESIOLOGY

## 2021-06-29 PROCEDURE — 250N000009 HC RX 250: Performed by: NEUROLOGICAL SURGERY

## 2021-06-29 PROCEDURE — 710N000012 HC RECOVERY PHASE 2, PER MINUTE: Performed by: NEUROLOGICAL SURGERY

## 2021-06-29 PROCEDURE — 250N000013 HC RX MED GY IP 250 OP 250 PS 637: Performed by: NEUROLOGICAL SURGERY

## 2021-06-29 PROCEDURE — 370N000017 HC ANESTHESIA TECHNICAL FEE, PER MIN: Performed by: NEUROLOGICAL SURGERY

## 2021-06-29 RX ORDER — ONDANSETRON 2 MG/ML
INJECTION INTRAMUSCULAR; INTRAVENOUS PRN
Status: DISCONTINUED | OUTPATIENT
Start: 2021-06-29 | End: 2021-06-29

## 2021-06-29 RX ORDER — NALOXONE HYDROCHLORIDE 0.4 MG/ML
0.4 INJECTION, SOLUTION INTRAMUSCULAR; INTRAVENOUS; SUBCUTANEOUS
Status: DISCONTINUED | OUTPATIENT
Start: 2021-06-29 | End: 2021-06-29 | Stop reason: HOSPADM

## 2021-06-29 RX ORDER — KETAMINE HYDROCHLORIDE 10 MG/ML
INJECTION INTRAMUSCULAR; INTRAVENOUS PRN
Status: DISCONTINUED | OUTPATIENT
Start: 2021-06-29 | End: 2021-06-29

## 2021-06-29 RX ORDER — BUPIVACAINE HYDROCHLORIDE AND EPINEPHRINE 5; 5 MG/ML; UG/ML
INJECTION, SOLUTION PERINEURAL PRN
Status: DISCONTINUED | OUTPATIENT
Start: 2021-06-29 | End: 2021-06-29 | Stop reason: HOSPADM

## 2021-06-29 RX ORDER — CEFAZOLIN SODIUM 2 G/100ML
2 INJECTION, SOLUTION INTRAVENOUS
Status: COMPLETED | OUTPATIENT
Start: 2021-06-29 | End: 2021-06-29

## 2021-06-29 RX ORDER — ACETAMINOPHEN 325 MG/1
975 TABLET ORAL EVERY 8 HOURS
Status: DISCONTINUED | OUTPATIENT
Start: 2021-06-29 | End: 2021-06-29 | Stop reason: HOSPADM

## 2021-06-29 RX ORDER — OXYCODONE HYDROCHLORIDE 5 MG/1
5 TABLET ORAL EVERY 4 HOURS PRN
Status: DISCONTINUED | OUTPATIENT
Start: 2021-06-29 | End: 2021-06-29 | Stop reason: HOSPADM

## 2021-06-29 RX ORDER — DEXAMETHASONE SODIUM PHOSPHATE 4 MG/ML
INJECTION, SOLUTION INTRA-ARTICULAR; INTRALESIONAL; INTRAMUSCULAR; INTRAVENOUS; SOFT TISSUE PRN
Status: DISCONTINUED | OUTPATIENT
Start: 2021-06-29 | End: 2021-06-29

## 2021-06-29 RX ORDER — SODIUM CHLORIDE, SODIUM LACTATE, POTASSIUM CHLORIDE, CALCIUM CHLORIDE 600; 310; 30; 20 MG/100ML; MG/100ML; MG/100ML; MG/100ML
INJECTION, SOLUTION INTRAVENOUS CONTINUOUS PRN
Status: DISCONTINUED | OUTPATIENT
Start: 2021-06-29 | End: 2021-06-29

## 2021-06-29 RX ORDER — AMOXICILLIN 250 MG
1 CAPSULE ORAL DAILY
Qty: 30 TABLET | Refills: 0 | Status: SHIPPED | OUTPATIENT
Start: 2021-06-29 | End: 2021-11-11

## 2021-06-29 RX ORDER — ONDANSETRON 4 MG/1
4 TABLET, ORALLY DISINTEGRATING ORAL EVERY 6 HOURS PRN
Status: DISCONTINUED | OUTPATIENT
Start: 2021-06-29 | End: 2021-06-29 | Stop reason: HOSPADM

## 2021-06-29 RX ORDER — OXYCODONE HYDROCHLORIDE 5 MG/1
10 TABLET ORAL EVERY 4 HOURS PRN
Status: DISCONTINUED | OUTPATIENT
Start: 2021-06-29 | End: 2021-06-29 | Stop reason: HOSPADM

## 2021-06-29 RX ORDER — SODIUM CHLORIDE, SODIUM LACTATE, POTASSIUM CHLORIDE, CALCIUM CHLORIDE 600; 310; 30; 20 MG/100ML; MG/100ML; MG/100ML; MG/100ML
INJECTION, SOLUTION INTRAVENOUS CONTINUOUS
Status: DISCONTINUED | OUTPATIENT
Start: 2021-06-29 | End: 2021-06-29 | Stop reason: HOSPADM

## 2021-06-29 RX ORDER — HYDROMORPHONE HCL IN WATER/PF 6 MG/30 ML
0.4 PATIENT CONTROLLED ANALGESIA SYRINGE INTRAVENOUS
Status: DISCONTINUED | OUTPATIENT
Start: 2021-06-29 | End: 2021-06-29 | Stop reason: HOSPADM

## 2021-06-29 RX ORDER — CEFAZOLIN SODIUM 1 G/3ML
1 INJECTION, POWDER, FOR SOLUTION INTRAMUSCULAR; INTRAVENOUS SEE ADMIN INSTRUCTIONS
Status: DISCONTINUED | OUTPATIENT
Start: 2021-06-29 | End: 2021-06-29 | Stop reason: HOSPADM

## 2021-06-29 RX ORDER — ONDANSETRON 2 MG/ML
4 INJECTION INTRAMUSCULAR; INTRAVENOUS EVERY 30 MIN PRN
Status: DISCONTINUED | OUTPATIENT
Start: 2021-06-29 | End: 2021-06-29 | Stop reason: HOSPADM

## 2021-06-29 RX ORDER — NALOXONE HYDROCHLORIDE 0.4 MG/ML
0.2 INJECTION, SOLUTION INTRAMUSCULAR; INTRAVENOUS; SUBCUTANEOUS
Status: DISCONTINUED | OUTPATIENT
Start: 2021-06-29 | End: 2021-06-29 | Stop reason: HOSPADM

## 2021-06-29 RX ORDER — PROPOFOL 10 MG/ML
INJECTION, EMULSION INTRAVENOUS PRN
Status: DISCONTINUED | OUTPATIENT
Start: 2021-06-29 | End: 2021-06-29

## 2021-06-29 RX ORDER — LIDOCAINE HYDROCHLORIDE 20 MG/ML
INJECTION, SOLUTION INFILTRATION; PERINEURAL PRN
Status: DISCONTINUED | OUTPATIENT
Start: 2021-06-29 | End: 2021-06-29

## 2021-06-29 RX ORDER — ACETAMINOPHEN 325 MG/1
975 TABLET ORAL ONCE
Status: COMPLETED | OUTPATIENT
Start: 2021-06-29 | End: 2021-06-29

## 2021-06-29 RX ORDER — FENTANYL CITRATE 50 UG/ML
INJECTION, SOLUTION INTRAMUSCULAR; INTRAVENOUS PRN
Status: DISCONTINUED | OUTPATIENT
Start: 2021-06-29 | End: 2021-06-29

## 2021-06-29 RX ORDER — PROCHLORPERAZINE MALEATE 5 MG
10 TABLET ORAL EVERY 6 HOURS PRN
Status: DISCONTINUED | OUTPATIENT
Start: 2021-06-29 | End: 2021-06-29 | Stop reason: HOSPADM

## 2021-06-29 RX ORDER — ONDANSETRON 2 MG/ML
4 INJECTION INTRAMUSCULAR; INTRAVENOUS EVERY 6 HOURS PRN
Status: DISCONTINUED | OUTPATIENT
Start: 2021-06-29 | End: 2021-06-29 | Stop reason: HOSPADM

## 2021-06-29 RX ORDER — OXYCODONE HYDROCHLORIDE 5 MG/1
5-10 TABLET ORAL EVERY 4 HOURS PRN
Qty: 20 TABLET | Refills: 0 | Status: SHIPPED | OUTPATIENT
Start: 2021-06-29 | End: 2021-11-11

## 2021-06-29 RX ORDER — ONDANSETRON 4 MG/1
4 TABLET, ORALLY DISINTEGRATING ORAL EVERY 30 MIN PRN
Status: DISCONTINUED | OUTPATIENT
Start: 2021-06-29 | End: 2021-06-29 | Stop reason: HOSPADM

## 2021-06-29 RX ORDER — GABAPENTIN 100 MG/1
300 CAPSULE ORAL
Status: COMPLETED | OUTPATIENT
Start: 2021-06-29 | End: 2021-06-29

## 2021-06-29 RX ORDER — PROPOFOL 10 MG/ML
INJECTION, EMULSION INTRAVENOUS CONTINUOUS PRN
Status: DISCONTINUED | OUTPATIENT
Start: 2021-06-29 | End: 2021-06-29

## 2021-06-29 RX ORDER — HYDROMORPHONE HYDROCHLORIDE 1 MG/ML
.3-.5 INJECTION, SOLUTION INTRAMUSCULAR; INTRAVENOUS; SUBCUTANEOUS EVERY 5 MIN PRN
Status: DISCONTINUED | OUTPATIENT
Start: 2021-06-29 | End: 2021-06-29 | Stop reason: HOSPADM

## 2021-06-29 RX ORDER — FENTANYL CITRATE 50 UG/ML
25-50 INJECTION, SOLUTION INTRAMUSCULAR; INTRAVENOUS
Status: DISCONTINUED | OUTPATIENT
Start: 2021-06-29 | End: 2021-06-29 | Stop reason: HOSPADM

## 2021-06-29 RX ORDER — ACETAMINOPHEN 325 MG/1
650 TABLET ORAL EVERY 4 HOURS PRN
Status: DISCONTINUED | OUTPATIENT
Start: 2021-07-02 | End: 2021-06-29 | Stop reason: HOSPADM

## 2021-06-29 RX ORDER — HYDROMORPHONE HCL IN WATER/PF 6 MG/30 ML
0.2 PATIENT CONTROLLED ANALGESIA SYRINGE INTRAVENOUS
Status: DISCONTINUED | OUTPATIENT
Start: 2021-06-29 | End: 2021-06-29 | Stop reason: HOSPADM

## 2021-06-29 RX ADMIN — MIDAZOLAM 2 MG: 1 INJECTION INTRAMUSCULAR; INTRAVENOUS at 07:57

## 2021-06-29 RX ADMIN — ROCURONIUM BROMIDE 20 MG: 10 INJECTION INTRAVENOUS at 08:56

## 2021-06-29 RX ADMIN — SODIUM CHLORIDE, SODIUM LACTATE, POTASSIUM CHLORIDE, CALCIUM CHLORIDE: 600; 310; 30; 20 INJECTION, SOLUTION INTRAVENOUS at 09:10

## 2021-06-29 RX ADMIN — DEXAMETHASONE SODIUM PHOSPHATE 10 MG: 4 INJECTION, SOLUTION INTRAMUSCULAR; INTRAVENOUS at 08:30

## 2021-06-29 RX ADMIN — CEFAZOLIN 2 G: 10 INJECTION, POWDER, FOR SOLUTION INTRAVENOUS at 08:10

## 2021-06-29 RX ADMIN — ROCURONIUM BROMIDE 50 MG: 10 INJECTION INTRAVENOUS at 08:05

## 2021-06-29 RX ADMIN — SODIUM CHLORIDE, POTASSIUM CHLORIDE, SODIUM LACTATE AND CALCIUM CHLORIDE: 600; 310; 30; 20 INJECTION, SOLUTION INTRAVENOUS at 08:50

## 2021-06-29 RX ADMIN — ACETAMINOPHEN 975 MG: 325 TABLET, FILM COATED ORAL at 07:22

## 2021-06-29 RX ADMIN — LIDOCAINE HYDROCHLORIDE 100 MG: 20 INJECTION, SOLUTION INFILTRATION; PERINEURAL at 08:03

## 2021-06-29 RX ADMIN — HYDROMORPHONE HYDROCHLORIDE 0.5 MG: 1 INJECTION, SOLUTION INTRAMUSCULAR; INTRAVENOUS; SUBCUTANEOUS at 10:13

## 2021-06-29 RX ADMIN — FENTANYL CITRATE 100 MCG: 50 INJECTION, SOLUTION INTRAMUSCULAR; INTRAVENOUS at 08:34

## 2021-06-29 RX ADMIN — OXYCODONE HYDROCHLORIDE 5 MG: 5 TABLET ORAL at 14:35

## 2021-06-29 RX ADMIN — ONDANSETRON 4 MG: 2 INJECTION INTRAMUSCULAR; INTRAVENOUS at 10:52

## 2021-06-29 RX ADMIN — PROPOFOL 50 MCG/KG/MIN: 10 INJECTION, EMULSION INTRAVENOUS at 08:25

## 2021-06-29 RX ADMIN — FENTANYL CITRATE 100 MCG: 50 INJECTION, SOLUTION INTRAMUSCULAR; INTRAVENOUS at 08:00

## 2021-06-29 RX ADMIN — ACETAMINOPHEN 975 MG: 325 TABLET, FILM COATED ORAL at 13:49

## 2021-06-29 RX ADMIN — HYDROMORPHONE HYDROCHLORIDE 0.5 MG: 1 INJECTION, SOLUTION INTRAMUSCULAR; INTRAVENOUS; SUBCUTANEOUS at 12:56

## 2021-06-29 RX ADMIN — MIDAZOLAM 2 MG: 1 INJECTION INTRAMUSCULAR; INTRAVENOUS at 07:51

## 2021-06-29 RX ADMIN — Medication 40 MG: at 08:03

## 2021-06-29 RX ADMIN — PROPOFOL 20 MG: 10 INJECTION, EMULSION INTRAVENOUS at 10:48

## 2021-06-29 RX ADMIN — ROCURONIUM BROMIDE 10 MG: 10 INJECTION INTRAVENOUS at 09:43

## 2021-06-29 RX ADMIN — PROPOFOL 150 MG: 10 INJECTION, EMULSION INTRAVENOUS at 08:03

## 2021-06-29 RX ADMIN — SUGAMMADEX 160 MG: 100 INJECTION, SOLUTION INTRAVENOUS at 11:15

## 2021-06-29 RX ADMIN — HYDROMORPHONE HYDROCHLORIDE 0.5 MG: 1 INJECTION, SOLUTION INTRAMUSCULAR; INTRAVENOUS; SUBCUTANEOUS at 12:35

## 2021-06-29 RX ADMIN — GABAPENTIN 300 MG: 300 CAPSULE ORAL at 07:22

## 2021-06-29 RX ADMIN — Medication 10 MG: at 09:08

## 2021-06-29 RX ADMIN — SODIUM CHLORIDE, POTASSIUM CHLORIDE, SODIUM LACTATE AND CALCIUM CHLORIDE: 600; 310; 30; 20 INJECTION, SOLUTION INTRAVENOUS at 07:51

## 2021-06-29 ASSESSMENT — MIFFLIN-ST. JEOR: SCORE: 1722

## 2021-06-29 NOTE — ANESTHESIA PROCEDURE NOTES
Airway       Patient location during procedure: OR  Staff -        CRNA: Donald Mathias APRN CRNA       Performed By: CRNA  Consent for Airway        Urgency: elective  Indications and Patient Condition       Indications for airway management: kezia-procedural       Induction type:intravenous       Mask difficulty assessment: 1 - vent by mask    Final Airway Details       Final airway type: endotracheal airway       Successful airway: ETT - single and Oral  Endotracheal Airway Details        ETT size (mm): 8.0       Cuffed: yes       Successful intubation technique: video laryngoscopy       VL Blade Size: MAC D Blade       Grade View of Cords: 1       Adjucts: stylet       Position: Right       Measured from: lips       Secured at (cm): 23       Bite block used: Soft (bilateral soft bite blocks inserted between molars)    Post intubation assessment        Placement verified by: capnometry, equal breath sounds and chest rise        Number of attempts at approach: 1       Secured with: silk tape       Ease of procedure: easy       Dentition: Intact and Unchanged    Medication(s) Administered   Medication Administration Time: 6/29/2021 8:07 AM

## 2021-06-29 NOTE — DISCHARGE INSTRUCTIONS
Resume your normal diet.    Take anti-constipation medications as prescribed to prevent constipation while taking oral opioid pain medications. If you go more than 48 hours without a bowel movement, increase the amount of anti-constipation medications you are taking, or call your PCP for assistance.    Keep incision covered and dry unless showering. OK to remove dressing to shower and let soapy water wash over the incision, and allow the incision to fully dry, then replace a dry bandage over incision to prevent clothing from rubbing incision.   Do not scrub or rub incision.   Do not submerge incision underwater- no tub baths, no swimming pools, no hot tubs until cleared by Neurosurgery. Do not put creams or ointments on incision.     If dressing becomes soiled or wet, change dressing immediately to dry dressing. Do not let wet dressing lay on incision. Keep incision dry at all times except while showering.    If clothing rubs against incision, cover incision with cotton dressing.     If incision ever develops consistent drainage or you develop a fever above 101.5 degrees, alert surgeon's team immediately.    No lifting above 10 pounds/no bending or twisting for 6 weeks after surgery.    No drinking alcohol while taking pain medications.  No using nicotine within 6 weeks of surgery, and we recommend to stop permanently for your overall spine health.    No aspirin until cleared by postop followup appointment at 2 weeks.    It is ok to take NSAIDs (Advil, Motrin, Alleve) starting 48 hours after surgery as needed.    Follow up with Cristiana Shearer NP on 7/13/21 at 12:30pm in virtual video visit. If specifically instructed to follow up in person, followup at in the [facility], [address] [place] [address], in Neurosurgery clinic on the 3rd floor. Call [phone] with questions about appointment.    Call Dr. Araujo' nurse Rainer Wisdom at [phone] with questions about surgery.            Same-Day Surgery   Adult  Discharge Orders & Instructions     For 24 hours after surgery:  1. Get plenty of rest.  A responsible adult must stay with you for at least 24 hours after you leave the hospital.   2. Pain medication can slow your reflexes. Do not drive or use heavy equipment.  If you have weakness or tingling, don't drive or use heavy equipment until this feeling goes away.  3. Mixing alcohol and pain medication can cause dizziness and slow your breathing. It can even be fatal. Do not drink alcohol while taking pain medication.  4. Avoid strenuous or risky activities.  Ask for help when climbing stairs.   5. You may feel lightheaded.  If so, sit for a few minutes before standing.  Have someone help you get up.   6. If you have nausea (feel sick to your stomach), drink only clear liquids such as apple juice, ginger ale, broth or 7-Up.  Rest may also help.  Be sure to drink enough fluids.  Move to a regular diet as you feel able. Take pain medications with a small amount of solid food, such as toast or crackers, to avoid nausea.   7. A slight fever is normal. Call the doctor if your fever is over 100 F (37.7 C) (taken under the tongue) or lasts longer than 24 hours.  8. You may have a dry mouth, muscle aches, trouble sleeping or a sore throat.  These symptoms should go away after 24 hours.  9. Do not make important or legal decisions.   Pain Management:      1. Take pain medication (if prescribed) for pain as directed by your physician.        2. WARNING: If the pain medication you have been prescribed contains Tylenol  (acetaminophen), DO NOT take additional doses of Tylenol (acetaminophen).     Call your doctor for any of the followin.  Signs of infection (fever, growing tenderness at the surgery site, severe pain, a large amount of drainage or bleeding, foul-smelling drainage, redness, swelling).    2.  It has been over 8 to 10 hours since surgery and you are still not able to urinate (pee).    3.  Headache for over 24  hours.    4.  Numbness, tingling or weakness the day after surgery (if you had spinal anesthesia).  To contact a doctor, call Dr. Araujo at [phone] or:      [phone] and ask for the Resident On Call for: Orthopedics (answered 24 hours a day)      Emergency Department:  [facility]: [phone]  [facility]: [phone]               Rev. 10/2014

## 2021-06-29 NOTE — ANESTHESIA POSTPROCEDURE EVALUATION
Patient: Jimmy López III    Procedure(s):  Open left lumbar 5-sacral 1 hemilaminectomy and foraminotomy medial facetectomy, and microdiskectomy    Diagnosis:Lumbar disc herniation with radiculopathy [M51.16]  Spinal stenosis of lumbar region without neurogenic claudication [M48.061]  Diagnosis Additional Information: No value filed.    Anesthesia Type:  General    Note:  Disposition: Inpatient   Postop Pain Control: Uneventful            Sign Out: Well controlled pain   PONV: No   Neuro/Psych: Uneventful            Sign Out: Acceptable/Baseline neuro status   Airway/Respiratory: Uneventful            Sign Out: Acceptable/Baseline resp. status   CV/Hemodynamics: Uneventful            Sign Out: Acceptable CV status; No obvious hypovolemia; No obvious fluid overload   Other NRE:    DID A NON-ROUTINE EVENT OCCUR?            Last vitals:  Vitals:    06/29/21 1345 06/29/21 1354 06/29/21 1430   BP: 96/87 119/87 126/84   Pulse: 90 81    Resp: 18 16    Temp:  36.4  C (97.5  F) 36.4  C (97.5  F)   SpO2: 97% 96% 100%       Last vitals prior to Anesthesia Care Transfer:  CRNA VITALS  6/29/2021 1058 - 6/29/2021 1158      6/29/2021             Temp:  36.1  C (97  F)     ax          Electronically Signed By: Melida Loya MD  June 29, 2021  4:22 PM

## 2021-06-29 NOTE — OP NOTE
Date of Procedure: 06/29/21    Preoperative diagnosis:  1. Large extruded left L5-S1disc herniation with radiculopathy  2. Lumbar stenosis     Postoperative diagnosis: same     Procedure performed:     Left L5-S1 hemilaminectomy and microdiscectomy   Left L5-S1 foraminotomy  Modifier 22 for entire procedure due to about five times normal operative difficulty and length due to extruded massive disc herniation with chronic epidural scar formation      Surgeon: Rosie Araujo MD    Assistant: Sweetie Gomez PA-C. There was no qualified resident available to assist in surgery    Anesthesia- general endotracheal    EBL: 50ml    INDICATIONS FOR SURGERY:  Jimmy López III is a 41 year old male with severe chronic left S1 radiculopathy referrable to an enormous left L5-S1 disc herniation with extrusion and cephalad migration with approximately 50% canal stenosis and complete effacement of left lateral recess from the L5 pedicle to just above the S1 pedicle due to magnitude of disc herniation.  The patient failed nonoperative management with severe debilitating ongoing symptoms, please refer to my clinic note for full details of presentation.     I discussed risks versus benefits of operative intervention including:   Bleeding, infection, nerve root damage, numbness, weakness, CSF leak, failure to heal, development of facet instability with subsequent need for fusion, disc reherniation, failure to improve, and need for further surgery. The patient understood and elected to proceed.    OPERATIVE COURSE AND INTRAOPERATIVE FINDINGS:  The patient was identified and informed consent was verified.      The patient was taken to the operating room where general endotracheal anesthesia was induced.  Perioperative antibiotics were administered within 1 hour of skin incision.  The patient was positioned prone on the Rene frame and all extremity points were appropriately padded.  The Rene frame was elevated to create lumbar flexion  to widen the lumbar intralaminar space    The patient was prepped and draped in the standard sterile fashion.  The fluoroscopy was draped and brought into the field.  An intraoperative timeout was taken to correctly identify patient and procedure. The fluoroscope was used to localize correct level for skin incision.  The area of planned skin incision was infiltrated with local anesthetic.   A 10 blade scalpel was used to make midline skin incision.  Monopolar electrocautery was used to dissect the subcutaneous tissues and to open the lumbar fascia.      Dissection was carried out to left of midline to expose left L5 hemilamina with extension of dissection up to level of about L5 pedicle because of extension of disc migration, retractors were placed, and fluoroscopy was used to verify correct level of operative location, with a spine time-out performed verifying left L5-S1 as the operative level.      The microscope was brought into the field in standard sterile fashion.      The entire left L5 hemilamina was cleaned of muscular attachments using monopolar electrocautery. The medial 50% of the left L5-S1 facet was exposed     The high-speed electric drill was used to drill a left L5 hemilaminectomy and the curved curette was used to dissect the ligamentous attachment of the ligamentum flavum away from the underside of the lamina. It was impossible to find a plane beneath the ligament in the left lateral recess because of extensive scar from the massive disc herniation. We had to remove the base of the spinous process across midline, find epidural fat, identify dura at midline, then resect a plane between the massive disc herniation and the dura. This took about 5 times normal length and difficulty for a disc herniation resection.  Eventually we were able to use microscissors and sharp scar plane dissection to create a plane along the lateral dura and the disc herniation, then Kerrison #2 and #3 rongeurs were used to  remove the ligamentum flavum, disc herniation, and scar, and expose the lateral aspect of the thecal sac. The laminectomy was carried up to the level of the pedicle because of the extent of the disc herniation. The disc herniation extended into the left L5-S1 foramen so a left L5-S1 foraminotomy was performed to decompress the nerve root. A SHARIFA'Dagmar nerve root retractor was used to gently retract the thecal sac until the disc herniation was visibly exposed; we had to exposed bone across level of midline to get a clear plane between the dura and the disc herniation to allow for thecal sac retraction away from the disc herniation extrusion.   This took quite a bit of dissection due to extent and size of disc herniation.    We further retracted the dura medially to expose the disc herniation and bipolar electrocautery was used to cauterize epidural veins that were clustered over the surface of the disc herniation to assist in hemostasis.  A 15 blade scalpel was used to incise the disc herniation making a small square window and a combination of pituitary rongeurs and down-biting Afshin curettes were used to resect disc herniation. The disc herniation/extrusion was extensive and was adherent to the thecal sac at multiple points.    This necessitated at least five times normal operative time and difficulty.     A blunt nerve probe was used to gently palpate ventromedially and irrigation was used in the disc space to ensure all compressive components of disc fragments were removed. A blunt tip nerve probe was used to palpate the traversing nerve root course as well as the ventral aspect of thecal sac and no residual compression was remaining.  Hemostasis was verified.    The incision was then closed using #1 Vicryl interrupted and running suture in the fascia followed by 2-0 Vicryl inverted interrupted sutures and running sutures in the subcutaneous layer followed by 4-0 Monocryl subcuticular stitch on the skin, Exofin,  and a sterile dressing.    All sponge and needle counts are correct prior to proceeding with closure and again at the conclusion of closure.    There were no intraoperative complications.    I performed the entire procedure personally.    The patient was positioned supine on the transport gurney and taken to the PACU in stable condition where he will be discharged home when meeting discharge criteria.    Rosie Araujo MD    [facility] Department of Neurosurgery  Office: [phone]    6/29/2021  11:16 AM

## 2021-06-29 NOTE — ANESTHESIA PREPROCEDURE EVALUATION
Anesthesia Pre-Procedure Evaluation    Patient: Jimmy López III   MRN: [id number] : 1980        Preoperative Diagnosis: Lumbar disc herniation with radiculopathy [M51.16]  Spinal stenosis of lumbar region without neurogenic claudication [M48.061]   Procedure : Procedure(s):  Open left lumbar 5-sacral 1 hemilaminectomy, medial facetectomy, and microdiskectomy, with microscope     Past Medical History:   Diagnosis Date     AVN (avascular necrosis of bone) (H)     left hip     Celiac disease 2020     Depression      Sciatica 2014      Past Surgical History:   Procedure Laterality Date     TOTAL HIP ARTHROPLASTY Left     2018      Allergies   Allergen Reactions     Varenicline      nighmares that persisted on this     Gluten Meal Nausea and Vomiting and Diarrhea     Patient states he feels like he is going to die , sometimes has blood in the vomit .     Pollen Extract Difficulty breathing     hayfever      Social History     Tobacco Use     Smoking status: Former Smoker     Packs/day: 0.25     Types: Cigarettes     Quit date: 4/15/2021     Years since quittin.2     Smokeless tobacco: Former User     Types: Chew     Tobacco comment: using patch, down to 5-6cigs /day as of 2020   Substance Use Topics     Alcohol use: Yes      Wt Readings from Last 1 Encounters:   21 77.4 kg (170 lb 10.2 oz)        Anesthesia Evaluation            ROS/MED HX  ENT/Pulmonary:  - neg pulmonary ROS     Neurologic:  - neg neurologic ROS     Cardiovascular:  - neg cardiovascular ROS     METS/Exercise Tolerance:     Hematologic:  - neg hematologic  ROS     Musculoskeletal:  - neg musculoskeletal ROS     GI/Hepatic:  - neg GI/hepatic ROS     Renal/Genitourinary:  - neg Renal ROS     Endo:  - neg endo ROS     Psychiatric/Substance Use:  - neg psychiatric ROS     Infectious Disease:  - neg infectious disease ROS     Malignancy:  - neg malignancy ROS     Other:            Physical Exam    Airway        Mallampati: II    TM distance: > 3 FB   Neck ROM: full   Mouth opening: > 3 cm    Respiratory Devices and Support         Dental  no notable dental history         Cardiovascular   cardiovascular exam normal          Pulmonary   pulmonary exam normal                OUTSIDE LABS:  CBC:   Lab Results   Component Value Date    WBC 6.7 06/28/2021    WBC 5.1 11/12/2020    HGB 15.5 06/28/2021    HGB 13.8 11/12/2020    HCT 46.8 06/28/2021    HCT 42.1 11/12/2020     06/28/2021     11/12/2020     BMP:   Lab Results   Component Value Date     11/12/2020     07/22/2018    POTASSIUM 4.5 11/12/2020    POTASSIUM 3.6 07/22/2018    CHLORIDE 108 11/12/2020    CHLORIDE 104 07/22/2018    CO2 29 11/12/2020    CO2 30 07/22/2018    BUN 14 11/12/2020    BUN 11 07/22/2018    CR 1.10 11/12/2020    CR 0.96 07/22/2018    GLC 91 11/12/2020    GLC 79 07/16/2020     COAGS:   Lab Results   Component Value Date    PTT 24 06/28/2021    INR 0.85 (L) 06/28/2021     POC:   Lab Results   Component Value Date     (H) 06/29/2021     HEPATIC:   Lab Results   Component Value Date    ALBUMIN 3.8 11/12/2020    PROTTOTAL 7.3 11/12/2020    ALT 53 11/12/2020    AST 41 11/12/2020    ALKPHOS 58 11/12/2020    BILITOTAL 0.5 11/12/2020     OTHER:   Lab Results   Component Value Date    MIMI 9.7 11/12/2020    TSH 1.26 07/16/2020    CRP <2.9 11/12/2020       Anesthesia Plan    ASA Status:  2   NPO Status:  NPO Appropriate    Anesthesia Type: General.     - Airway: ETT   Induction: Intravenous.   Maintenance: Balanced.   Techniques and Equipment:     - Lines/Monitors: 2nd IV     Consents         - Extended Intubation/Ventilatory Support Discussed: No.    Use of blood products discussed: No .     Postoperative Care    Pain management: Multi-modal analgesia.   PONV prophylaxis: Ondansetron (or other 5HT-3), Dexamethasone or Solumedrol     Comments:    GETTA with std monitors. ?? Arterial line ONLY if surgeon requests it otherwise not needed    Ketamine  boluses 10 mg/hourly and 40 mg at induction            Melida Loya MD

## 2021-06-29 NOTE — ANESTHESIA CARE TRANSFER NOTE
Patient: Jimmy López III    Procedure(s):  Open left lumbar 5-sacral 1 hemilaminectomy and foraminotomy medial facetectomy, and microdiskectomy    Diagnosis: Lumbar disc herniation with radiculopathy [M51.16]  Spinal stenosis of lumbar region without neurogenic claudication [M48.061]  Diagnosis Additional Information: No value filed.    Anesthesia Type:   General     Note:    Oropharynx: spontaneously breathing and oral airway in place  Level of Consciousness: iatrogenic sedation  Oxygen Supplementation: face mask  Level of Supplemental Oxygen (L/min / FiO2): 8  Independent Airway: airway patency satisfactory and stable  Dentition: dentition unchanged  Vital Signs Stable: post-procedure vital signs reviewed and stable  Report to RN Given: handoff report given  Patient transferred to: PACU    Handoff Report: Identifed the Patient, Identified the Reponsible Provider, Reviewed the pertinent medical history, Discussed the surgical course, Reviewed Intra-OP anesthesia mangement and issues during anesthesia, Set expectations for post-procedure period and Allowed opportunity for questions and acknowledgement of understanding      Vitals: (Last set prior to Anesthesia Care Transfer)  CRNA VITALS  6/29/2021 1058 - 6/29/2021 1135      6/29/2021             Temp:  36.1  C (97  F)     ax        Electronically Signed By: ABDOUL Dudley CRNA  June 29, 2021  11:35 AM

## 2021-06-29 NOTE — PROGRESS NOTES
Patient seen and examined  Risks/benefits/complications reviewed.  Left-sided pain and leg numbness from left posterior upper thigh down posterior thigh, posterior calf, numbness in bottom of foot, calf, lateral 3 toes, atrophy of left gastrocnemius muscle, asymmetric left plantar flexion weakness.    Symptoms severe for years, I reviewed that numbness and weakness will likely persist after surgery due to duration of symptoms but hopefully we can see improvement of sharp shooting radicular pain.    Patient stated understanding.    Proceeding with open left L5-S1 microdiskectomy

## 2021-06-29 NOTE — BRIEF OP NOTE
[facility]    Brief Operative Note    Pre-operative diagnosis: Lumbar disc herniation with radiculopathy [M51.16]  Spinal stenosis of lumbar region without neurogenic claudication [M48.061]  Post-operative diagnosis Same as pre-operative diagnosis    Procedure: Procedure(s):  Open left lumbar 5-sacral 1 hemilaminectomy and foraminotomy medial facetectomy, and microdiskectomy  Surgeon: Surgeon(s) and Role:     * Rosie Araujo MD - Primary     * Sweetie Gomez PA-C  Anesthesia: General   Estimated blood loss: 50 mL  Drains: None  Specimens: * No specimens in log *  Findings:   None.  Complications: None.  Implants: * No implants in log *    Plan:  Same day discharge  Meds sent to pharmacy here  Post-op appt made    Indication For Assistant:   The procedure was medically necessary for an assistant. I provided hemostasis, assisted with instrumentation, and provided exposure, retraction, and skin closure.  The assistance that I provided reduced operative time which meant less general anesthetic for the patient.    Sweetie Gomez PA-C  Pager #[phone]    Please page me  with any questions/concerns during regular weekday hours before 4pm. If there is no response, if it is a weekend, or if it is during evening hours then please page the orthopaedic surgery resident on call.

## 2021-07-07 ENCOUNTER — TELEPHONE (OUTPATIENT)
Dept: NEUROSURGERY | Facility: CLINIC | Age: 41
End: 2021-07-07

## 2021-07-08 LAB
COTININE UR QL CFM: 292 NG/ML
COTININE UR QL: NORMAL NG/ML
DRUG SCREEN COMMENT UR-IMP: NORMAL
NICOTINE UR QL CFM: 10 NG/ML
NICOTINE+COTININE UR QL CFM: 302 NG/ML

## 2021-07-13 ENCOUNTER — VIRTUAL VISIT (OUTPATIENT)
Dept: NEUROSURGERY | Facility: CLINIC | Age: 41
End: 2021-07-13
Payer: COMMERCIAL

## 2021-07-13 DIAGNOSIS — M54.16 LUMBAR RADICULOPATHY: Primary | ICD-10-CM

## 2021-07-13 DIAGNOSIS — M51.16 LUMBAR DISC HERNIATION WITH RADICULOPATHY: ICD-10-CM

## 2021-07-13 DIAGNOSIS — Z98.890 S/P LUMBAR MICRODISCECTOMY: ICD-10-CM

## 2021-07-13 PROCEDURE — 99024 POSTOP FOLLOW-UP VISIT: CPT | Performed by: NURSE PRACTITIONER

## 2021-07-13 NOTE — LETTER
"7/13/2021       RE: Jimmy López III  [address]  [place] [address]     Dear Colleague,    Thank you for referring your patient, Jimmy López III, to the [facility] NEUROSURGERY CLINIC [place] at [facility]. Please see a copy of my visit note below.    NEUROSURGERY CLINIC NOTE     Reason for Visit:              Post Operative Evaluation and Wound Assessment     Procedure performed:   6/29/2021      Left L5-S1 hemilaminectomy and microdiscectomy   Left L5-S1 foraminotomy    Surgeon: Rosie Araujo MD     HISTORY OF PRESENT ILLNESS:  Jimmy López III is a 41 year old male presented to the Neurosurgery with back pain and pain which  radiated down left leg (posterior), to buttock, then down leg into toes  Numbness down the back of the leg and into the bottom of the foot, consistent with severe chronic left S1 radiculopathy referrable to an enormous left L5-S1 disc herniation with extrusion and cephalad migration with approximately 50% canal stenosis and complete effacement of left lateral recess from the L5 pedicle to just above the S1 pedicle due to magnitude of disc herniation.  The patient failed nonoperative management with severe debilitating ongoing symptoms.     Dr. Araujo discussed risks versus benefits of operative intervention including: Bleeding, infection, nerve root damage, numbness, weakness, CSF leak, failure to heal, development of facet instability with subsequent need for fusion, disc reherniation, failure to improve, and need for further surgery. The patient understood and elected to proceed.    Today,   Back pain is \"minimal\"   He has some residual numbness and tingling in the left foot and last toe  No radiating pain down the leg, but some numbness in calf area (w/touch)   \"big improvement from before surgery\"  Ambulating independently without assistive device   No bowel or bladder concerns or complaints   He does auditing " and desk work.   He feels he will be ready to go back, and plan is to return next week   No other new concerns/complaints post-surgery   He does not feel he requires formal physical therapy and has been increasing his activity      Current Outpatient Medications   Medication     Cyanocobalamin (VITAMIN B 12 PO)     cyclobenzaprine (FLEXERIL) 5 MG tablet   -   Not Taking      famotidine (PEPCID) 20 MG tablet     Fexofenadine HCl (ALLERGY 24-HR PO)     gabapentin (NEURONTIN) 300 MG capsule  -  No longer taking      HYDROXYZINE PAMOATE PO     Multiple Vitamins-Minerals (BIO-35 GLUTEN-FREE PO)     oxyCODONE (ROXICODONE) 5 MG tablet  -  Has not Required   (just 3 days post-surgery)      pantoprazole (PROTONIX) 40 MG EC tablet     senna-docusate (SENOKOT-S/PERICOLACE) 8.6-50 MG tablet     sertraline (ZOLOFT) 100 MG tablet     traZODone (DESYREL) 50 MG tablet     TURMERIC PO     No current facility-administered medications for this visit.       Examination:   There were no vitals taken for this visit.      Neurological Assessment:    Video examination  General    Alert, cooperative.  No acute distress  Pulmonary:   Breathing comfortably on room air. No cough, or shortness of breath  Skin:    Visible skin without lesions or obvious rash  Speech is fluent  Maintains eye contact  Musculoskeletal:    Moving extremities freely with good strength      Incision:    Clean, dry, and intact.  There is no redness/erythema, swelling, or open drainage.   Closed w/surgical glue which has started to fall off on its own.     Assessment:   S/p Left L5-S1 hemilaminectomy and microdiscectomy   Left L5-S1 foraminotomy      Plan:   ~Okay to return to work as planned, next week  ~Reminder to take rest breaks, move around, stretch, to avoid prolonged sitting   ~Continue activity and lifting restrictions with no lifting greater than 8-10 lbs  ~Avoid significant twisting, or bending of the spine  ~Return to the Neurosurgery Clinic for routine  virtual video 6 week post-op visit.     At the end of the visit, all the patient's questions and concerns had been addressed and the patient was agreeable with the plan of care as outlined above. The patient has our office contact information at [phone], and knows to call with any questions, concerns, or changes in condition.       Cristiana Shearer DNP  Neurosurgery Nurse Practitioner  [facility]  [phone]      Jimmy is a 41 year old who is being evaluated via video visit, conducted during 90-global post-operative time period.     This is a video/telephone visit conducted due to [facility] restrictions on non-urgent clinic visits due to risk of the spread of COVID-19 pandemic virus. The patient did not identify any urgent or red-flag symptoms that would require in-person evaluation.      How would you like to obtain your AVS? MyChart  If the video visit is dropped, the invitation should be resent by: Text to cell phone: [phone]  Will anyone else be joining your video visit? No      Video Start Time:    12:38 pm     Video-Visit Details    Type of service:  Video Visit    Video End Time   12:49 pm     Originating Location (pt. Location): Home    Distant Location (provider location):  [facility] NEUROSURGERY CLINIC [place]     Platform used for Video Visit: [facility]      Chief Complaint   Patient presents with     RECHECK     VIDEO VISIT RETURN      Roc Raymond        Again, thank you for allowing me to participate in the care of your patient.      Sincerely,    ABDOUL Batista CNP

## 2021-07-13 NOTE — PATIENT INSTRUCTIONS
~Okay to return to work as planned, next week  ~Reminder to take rest breaks, move around, stretch, to avoid prolonged sitting   ~Continue activity and lifting restrictions with no lifting greater than 8-10 lbs  ~Avoid significant twisting, or bending of the spine  ~Return to the Neurosurgery Clinic for routine virtual video 6 week post-op visit.     At the end of the visit, all the patient's questions and concerns had been addressed and the patient was agreeable with the plan of care as outlined above. The patient has our office contact information at [phone], and knows to call with any questions, concerns, or changes in condition.

## 2021-07-13 NOTE — PROGRESS NOTES
"NEUROSURGERY CLINIC NOTE     Reason for Visit:              Post Operative Evaluation and Wound Assessment     Procedure performed:   6/29/2021      Left L5-S1 hemilaminectomy and microdiscectomy   Left L5-S1 foraminotomy    Surgeon: Rosie Araujo MD     HISTORY OF PRESENT ILLNESS:  Jimmy López III is a 41 year old male presented to the Neurosurgery with back pain and pain which  radiated down left leg (posterior), to buttock, then down leg into toes  Numbness down the back of the leg and into the bottom of the foot, consistent with severe chronic left S1 radiculopathy referrable to an enormous left L5-S1 disc herniation with extrusion and cephalad migration with approximately 50% canal stenosis and complete effacement of left lateral recess from the L5 pedicle to just above the S1 pedicle due to magnitude of disc herniation.  The patient failed nonoperative management with severe debilitating ongoing symptoms.     Dr. Araujo discussed risks versus benefits of operative intervention including: Bleeding, infection, nerve root damage, numbness, weakness, CSF leak, failure to heal, development of facet instability with subsequent need for fusion, disc reherniation, failure to improve, and need for further surgery. The patient understood and elected to proceed.    Today,   Back pain is \"minimal\"   He has some residual numbness and tingling in the left foot and last toe  No radiating pain down the leg, but some numbness in calf area (w/touch)   \"big improvement from before surgery\"  Ambulating independently without assistive device   No bowel or bladder concerns or complaints   He does auditing and desk work.   He feels he will be ready to go back, and plan is to return next week   No other new concerns/complaints post-surgery   He does not feel he requires formal physical therapy and has been increasing his activity      Current Outpatient Medications   Medication     Cyanocobalamin (VITAMIN B 12 PO)     " cyclobenzaprine (FLEXERIL) 5 MG tablet   -   Not Taking      famotidine (PEPCID) 20 MG tablet     Fexofenadine HCl (ALLERGY 24-HR PO)     gabapentin (NEURONTIN) 300 MG capsule  -  No longer taking      HYDROXYZINE PAMOATE PO     Multiple Vitamins-Minerals (BIO-35 GLUTEN-FREE PO)     oxyCODONE (ROXICODONE) 5 MG tablet  -  Has not Required   (just 3 days post-surgery)      pantoprazole (PROTONIX) 40 MG EC tablet     senna-docusate (SENOKOT-S/PERICOLACE) 8.6-50 MG tablet     sertraline (ZOLOFT) 100 MG tablet     traZODone (DESYREL) 50 MG tablet     TURMERIC PO     No current facility-administered medications for this visit.       Examination:   There were no vitals taken for this visit.      Neurological Assessment:    Video examination  General    Alert, cooperative.  No acute distress  Pulmonary:   Breathing comfortably on room air. No cough, or shortness of breath  Skin:    Visible skin without lesions or obvious rash  Speech is fluent  Maintains eye contact  Musculoskeletal:    Moving extremities freely with good strength      Incision:    Clean, dry, and intact.  There is no redness/erythema, swelling, or open drainage.   Closed w/surgical glue which has started to fall off on its own.     Assessment:   S/p Left L5-S1 hemilaminectomy and microdiscectomy   Left L5-S1 foraminotomy      Plan:   ~Okay to return to work as planned, next week  ~Reminder to take rest breaks, move around, stretch, to avoid prolonged sitting   ~Continue activity and lifting restrictions with no lifting greater than 8-10 lbs  ~Avoid significant twisting, or bending of the spine  ~Return to the Neurosurgery Clinic for routine virtual video 6 week post-op visit.     At the end of the visit, all the patient's questions and concerns had been addressed and the patient was agreeable with the plan of care as outlined above. The patient has our office contact information at [phone], and knows to call with any questions, concerns, or changes in  condition.       Cristiana Shearer DNP  Neurosurgery Nurse Practitioner  [facility]  [phone]

## 2021-07-13 NOTE — PROGRESS NOTES
Jimmy is a 41 year old who is being evaluated via video visit, conducted during 90-global post-operative time period.     This is a video/telephone visit conducted due to [facility] restrictions on non-urgent clinic visits due to risk of the spread of COVID-19 pandemic virus. The patient did not identify any urgent or red-flag symptoms that would require in-person evaluation.      How would you like to obtain your AVS? MyChart  If the video visit is dropped, the invitation should be resent by: Text to cell phone: [phone]  Will anyone else be joining your video visit? No      Video Start Time:    12:38 pm     Video-Visit Details    Type of service:  Video Visit    Video End Time   12:49 pm     Originating Location (pt. Location): Home    Distant Location (provider location):  [facility] NEUROSURGERY CLINIC [place]     Platform used for Video Visit: Boo      Chief Complaint   Patient presents with     RECHECK     VIDEO VISIT RETURN      Roc Andrade

## 2021-09-03 ENCOUNTER — HOSPITAL ENCOUNTER (OUTPATIENT)
Dept: BEHAVIORAL HEALTH | Facility: CLINIC | Age: 41
End: 2021-09-03
Attending: FAMILY MEDICINE
Payer: COMMERCIAL

## 2021-09-03 PROCEDURE — 999N000216 HC STATISTIC ADULT CD FACE TO FACE-NO CHRG: Mod: GT | Performed by: SOCIAL WORKER

## 2021-09-03 ASSESSMENT — ANXIETY QUESTIONNAIRES
2. NOT BEING ABLE TO STOP OR CONTROL WORRYING: MORE THAN HALF THE DAYS
7. FEELING AFRAID AS IF SOMETHING AWFUL MIGHT HAPPEN: SEVERAL DAYS
1. FEELING NERVOUS, ANXIOUS, OR ON EDGE: MORE THAN HALF THE DAYS
GAD7 TOTAL SCORE: 12
6. BECOMING EASILY ANNOYED OR IRRITABLE: SEVERAL DAYS
8. IF YOU CHECKED OFF ANY PROBLEMS, HOW DIFFICULT HAVE THESE MADE IT FOR YOU TO DO YOUR WORK, TAKE CARE OF THINGS AT HOME, OR GET ALONG WITH OTHER PEOPLE?: SOMEWHAT DIFFICULT
GAD7 TOTAL SCORE: 12
GAD7 TOTAL SCORE: 12
7. FEELING AFRAID AS IF SOMETHING AWFUL MIGHT HAPPEN: SEVERAL DAYS
3. WORRYING TOO MUCH ABOUT DIFFERENT THINGS: MORE THAN HALF THE DAYS
4. TROUBLE RELAXING: MORE THAN HALF THE DAYS
5. BEING SO RESTLESS THAT IT IS HARD TO SIT STILL: MORE THAN HALF THE DAYS

## 2021-09-03 ASSESSMENT — PATIENT HEALTH QUESTIONNAIRE - PHQ9
10. IF YOU CHECKED OFF ANY PROBLEMS, HOW DIFFICULT HAVE THESE PROBLEMS MADE IT FOR YOU TO DO YOUR WORK, TAKE CARE OF THINGS AT HOME, OR GET ALONG WITH OTHER PEOPLE: SOMEWHAT DIFFICULT
SUM OF ALL RESPONSES TO PHQ QUESTIONS 1-9: 14
SUM OF ALL RESPONSES TO PHQ QUESTIONS 1-9: 14

## 2021-09-03 NOTE — PATIENT INSTRUCTIONS
Individual Therapy Appointment   Appointment Date: 9/8/2021   Appointment Time: 1:00 PM   Location: [facility]   Eber Ramirez   [address] [place] [address]   [phone]     Medication Management Appointment   Appointment Date: 9/17/2021   Appointment Time: 9:00 AM   Location: [facility]   Imer Hill CNP,PMCATHIP,RN   [address] [place] [id number] [phone]         Crisis Resources   Resources: Mental health crisis response for your county is offered 24 hours a day, 7 days a week. A trained counselor will assess your current situation, offer support and counseling and connect you with local resources. Please call  [facility] Crisis Response [phone]     Other crisis lines you may contact:   National Suicide Prevention Lifeline at 9-143-192-JZPN (8804)   Throughout  [place]: call **CRISIS (**027667)   Crisis Text Line: is available for free, 24/7 by texting [place] to 757482   [employer] at 746-018-5038   [facility] [employer] [place] [facility] Helpline at [phone]     Safety and Wellness:  Take all medicines as directed.  Make no changes unless your doctor suggests them. Follow treatment recommendations.  Refrain from alcohol and non-prescribed drugs. If there is a concern for safety, call [phone].

## 2021-09-03 NOTE — PROGRESS NOTES
"[facility]    ADULT Mental Health Assessment Appointment Note    PATIENT'S NAME:  Jimmy López III    Preferred Pronoun: Jimmy  MRN: [id number]  YOB: 1980  Address:  [address] [place] [address] [address] [place] [address]  PREFERRED PHONE: [phone]   May we leave a referral related message: Yes    DATE OF SERVICE: 9/03/21  START TIME: 11:00am  END TIME: 11:15am  SERVICE MODALITY:  Video Visit:      Identifying Information:  Patient is a 41 year old.  Patient was referred for an assessment by self.  Patient attended the session alone. Patient identified their preferred language to be English. Patient reported they does not need the assistance of an  or other support involved in therapy.     Services Requested:   The reason for seeking services at this time is: \"Getting back on my deprrssion plan\".  The problem(s) began 09/03/05.  Patient reports he wants to get back on medications. He was previously taking sertraline and wants to start individual therapy.  He had difficulty navigating getting appropriate appointments  through My Chart.      Mental Health:  Review of Symptoms per patient report:  Depression: Change in sleep, Lack of interest, Change in energy level, Difficulties concentrating, Ruminations and Feeling sad, down, or depressed  Emerita: No Symptoms  Psychosis: No Symptoms  Anxiety: Excessive worry, Sleep disturbance and Ruminations  Panic: No symptoms  Post Traumatic Stress Disorder: Did not assess  Eating Disorder: Did not assess  ADD / ADHD: Did not assess  Conduct Disorder: No symptoms  Autism Spectrum Disorder: No symptoms  Obsessive Compulsive Disorder: No Symptoms    Substance Use:  Patient is not currently receiving any chemical dependency treatment.     Significant Losses / Trauma / Abuse / Neglect Issues:   NA    Medical History:  NA    Current Mental Status Exam:   Unable to complete full MSE. DA completed via a telephone " visit  Attitude / Demeanor: Cooperative   Speech      Rate / Production: Normal/ Responsive      Volume:  Normal  volume      Language:  intact  Mood:   Normal  Thought Content: Clear   Thought Process: Logical       Associations: No loosening of associations  Insight:   Good   Judgment:  Intact   Orientation:  All  Attention/concentration: Good     Rating Scales:  PHQ9:    PHQ-9 SCORE 7/16/2020 9/3/2021   PHQ-9 Total Score MyChart - 14 (Moderate depression)   PHQ-9 Total Score 13 14   ;    GAD7:    THONG-7 SCORE 7/16/2020 9/3/2021   Total Score - 12 (moderate anxiety)   Total Score 19 12       Safety Assessment:   Current Safety Concerns:Patient denied suicidal thoughts. Has had thoughts that he would be better off dead (see PHQ9) but no thoughts to harm self. Denies intent and a plan. Crisis resources shared via My Chart    Patient denies current homicidal ideation and behaviors.  Patient denies current self-injurious ideation and behaviors.    Patient denied risk behaviors associated with substance use.  Patient denies any high risk behaviors associated with mental health symptoms.  Patient reports the following current concerns for their personal safety: None.  Patient reports there are not  firearms in the house. .     Clinical Impressions:  A) Recurrent episode(s) - symptoms have been present during the same 2-week period and represent a change from previous functioning 5 or more symptoms (required for diagnosis)   - Depressed mood. Note: In children and adolescents, can be irritable mood.     - Diminished interest or pleasure in all, or almost all, activities.    - Fatigue or loss of energy.    - Feelings of worthlessness or inappropriate and excessive guilt.    - Diminished ability to think or concentrate, or indecisiveness.   B) The symptoms cause clinically significant distress or impairment in social, occupational, or other important areas of functioning  C) The episode is not attributable to the  physiological effects of a substance or to another medical condition  D) The occurence of major depressive episode is not better explained by other thought / psychotic disorders  E) There has never been a manic episode or hypomanic episode    Therapeutic Interventions Provided: supportive active listening    Recommendations/Plan: Therapy and medication management.     Referrals:   Individual Therapy Appointment   Appointment Date: 9/8/2021   Appointment Time: 1:00 PM   Location: [facility]   [address] [facility] [place] [address]   [phone]     Medication Management Appointment   Appointment Date: 9/17/2021   Appointment Time: 9:00 AM   Location: [facility]   Imer Hill, CNP,PMCATHIP,RN   [address] [place] [id number] [phone]       Crisis Resources   Resources: Mental health crisis response for your county is offered 24 hours a day, 7 days a week. A trained counselor will assess your current situation, offer support and counseling and connect you with local resources. Please call  [facility] Crisis Response [phone]     Other crisis lines you may contact:   National Suicide Prevention Lifeline at 1-054-293-POBS (7155)   Throughout  [place]: call **CRISIS (**607390)   Crisis Text Line: is available for free, 24/7 by texting [place] to 811769   [employer] at 793-136-9702   [facility] Helpline at [phone]     Safety and Wellness:  Take all medicines as directed.  Make no changes unless your doctor suggests them. Follow treatment recommendations.  Refrain from alcohol and non-prescribed drugs. If there is a concern for safety, call 430.               KELSIE Lr, LICSW  September 3, 2021  [facility]

## 2021-09-03 NOTE — ADDENDUM NOTE
Encounter addended by: Perlita Britton, OBINNASW on: 9/3/2021 11:21 AM   Actions taken: Charge Capture section accepted

## 2021-09-04 ASSESSMENT — ANXIETY QUESTIONNAIRES: GAD7 TOTAL SCORE: 12

## 2021-09-04 ASSESSMENT — PATIENT HEALTH QUESTIONNAIRE - PHQ9: SUM OF ALL RESPONSES TO PHQ QUESTIONS 1-9: 14

## 2021-10-03 ENCOUNTER — HEALTH MAINTENANCE LETTER (OUTPATIENT)
Age: 41
End: 2021-10-03

## 2021-11-11 ENCOUNTER — OFFICE VISIT (OUTPATIENT)
Dept: PEDIATRICS | Facility: CLINIC | Age: 41
End: 2021-11-11
Payer: COMMERCIAL

## 2021-11-11 VITALS
SYSTOLIC BLOOD PRESSURE: 120 MMHG | TEMPERATURE: 98.3 F | DIASTOLIC BLOOD PRESSURE: 74 MMHG | WEIGHT: 174 LBS | RESPIRATION RATE: 20 BRPM | HEIGHT: 71 IN | HEART RATE: 100 BPM | BODY MASS INDEX: 24.36 KG/M2

## 2021-11-11 DIAGNOSIS — Z11.3 ROUTINE SCREENING FOR STI (SEXUALLY TRANSMITTED INFECTION): ICD-10-CM

## 2021-11-11 DIAGNOSIS — F17.200 NICOTINE DEPENDENCE WITH CURRENT USE: ICD-10-CM

## 2021-11-11 DIAGNOSIS — F32.0 MILD MAJOR DEPRESSION (H): ICD-10-CM

## 2021-11-11 DIAGNOSIS — K92.0 HEMATEMESIS, PRESENCE OF NAUSEA NOT SPECIFIED: ICD-10-CM

## 2021-11-11 DIAGNOSIS — Z23 NEED FOR COVID-19 VACCINE: ICD-10-CM

## 2021-11-11 DIAGNOSIS — Z13.1 SCREENING FOR DIABETES MELLITUS: ICD-10-CM

## 2021-11-11 DIAGNOSIS — Z00.00 ROUTINE GENERAL MEDICAL EXAMINATION AT A HEALTH CARE FACILITY: Primary | ICD-10-CM

## 2021-11-11 DIAGNOSIS — Z23 NEED FOR INFLUENZA VACCINATION: ICD-10-CM

## 2021-11-11 DIAGNOSIS — Z11.59 NEED FOR HEPATITIS C SCREENING TEST: ICD-10-CM

## 2021-11-11 DIAGNOSIS — R12 HEARTBURN: ICD-10-CM

## 2021-11-11 LAB — HBA1C MFR BLD: 5.5 % (ref 0–5.6)

## 2021-11-11 PROCEDURE — 91300 COVID-19,PF,PFIZER (12+ YRS): CPT | Performed by: STUDENT IN AN ORGANIZED HEALTH CARE EDUCATION/TRAINING PROGRAM

## 2021-11-11 PROCEDURE — 86780 TREPONEMA PALLIDUM: CPT | Performed by: STUDENT IN AN ORGANIZED HEALTH CARE EDUCATION/TRAINING PROGRAM

## 2021-11-11 PROCEDURE — 86803 HEPATITIS C AB TEST: CPT | Performed by: STUDENT IN AN ORGANIZED HEALTH CARE EDUCATION/TRAINING PROGRAM

## 2021-11-11 PROCEDURE — 99213 OFFICE O/P EST LOW 20 MIN: CPT | Mod: 25 | Performed by: STUDENT IN AN ORGANIZED HEALTH CARE EDUCATION/TRAINING PROGRAM

## 2021-11-11 PROCEDURE — 87591 N.GONORRHOEAE DNA AMP PROB: CPT | Performed by: STUDENT IN AN ORGANIZED HEALTH CARE EDUCATION/TRAINING PROGRAM

## 2021-11-11 PROCEDURE — 83036 HEMOGLOBIN GLYCOSYLATED A1C: CPT | Performed by: STUDENT IN AN ORGANIZED HEALTH CARE EDUCATION/TRAINING PROGRAM

## 2021-11-11 PROCEDURE — 99396 PREV VISIT EST AGE 40-64: CPT | Mod: GC | Performed by: STUDENT IN AN ORGANIZED HEALTH CARE EDUCATION/TRAINING PROGRAM

## 2021-11-11 PROCEDURE — 87389 HIV-1 AG W/HIV-1&-2 AB AG IA: CPT | Performed by: STUDENT IN AN ORGANIZED HEALTH CARE EDUCATION/TRAINING PROGRAM

## 2021-11-11 PROCEDURE — 90471 IMMUNIZATION ADMIN: CPT | Performed by: STUDENT IN AN ORGANIZED HEALTH CARE EDUCATION/TRAINING PROGRAM

## 2021-11-11 PROCEDURE — 0004A COVID-19,PF,PFIZER (12+ YRS): CPT | Performed by: STUDENT IN AN ORGANIZED HEALTH CARE EDUCATION/TRAINING PROGRAM

## 2021-11-11 PROCEDURE — 90686 IIV4 VACC NO PRSV 0.5 ML IM: CPT | Performed by: STUDENT IN AN ORGANIZED HEALTH CARE EDUCATION/TRAINING PROGRAM

## 2021-11-11 PROCEDURE — 36415 COLL VENOUS BLD VENIPUNCTURE: CPT | Performed by: STUDENT IN AN ORGANIZED HEALTH CARE EDUCATION/TRAINING PROGRAM

## 2021-11-11 PROCEDURE — 87491 CHLMYD TRACH DNA AMP PROBE: CPT | Performed by: STUDENT IN AN ORGANIZED HEALTH CARE EDUCATION/TRAINING PROGRAM

## 2021-11-11 RX ORDER — PANTOPRAZOLE SODIUM 40 MG/1
40 TABLET, DELAYED RELEASE ORAL DAILY
Qty: 90 TABLET | Refills: 0 | Status: SHIPPED | OUTPATIENT
Start: 2021-11-11 | End: 2022-08-26

## 2021-11-11 RX ORDER — BUPROPION HYDROCHLORIDE 150 MG/1
150 TABLET, EXTENDED RELEASE ORAL 2 TIMES DAILY
Qty: 180 TABLET | Refills: 0 | Status: SHIPPED | OUTPATIENT
Start: 2021-11-11 | End: 2022-08-26

## 2021-11-11 RX ORDER — SERTRALINE HYDROCHLORIDE 100 MG/1
TABLET, FILM COATED ORAL
Status: CANCELLED | OUTPATIENT
Start: 2021-11-11

## 2021-11-11 RX ORDER — LORATADINE 10 MG/1
10 TABLET ORAL DAILY
COMMUNITY

## 2021-11-11 RX ORDER — ASCORBIC ACID 100 MG
1 TABLET,CHEWABLE ORAL DAILY
COMMUNITY

## 2021-11-11 ASSESSMENT — ENCOUNTER SYMPTOMS
CHILLS: 0
DIZZINESS: 0
ABDOMINAL PAIN: 0
ARTHRALGIAS: 1
FEVER: 0
HEADACHES: 0
DYSURIA: 0
HEMATURIA: 0
DIARRHEA: 0
NERVOUS/ANXIOUS: 1
PARESTHESIAS: 0
PALPITATIONS: 0
FREQUENCY: 0
HEARTBURN: 1
JOINT SWELLING: 1
CONSTIPATION: 0
SHORTNESS OF BREATH: 1
MYALGIAS: 1
HEMATOCHEZIA: 0
WEAKNESS: 1
COUGH: 0
SORE THROAT: 0
EYE PAIN: 0

## 2021-11-11 ASSESSMENT — ANXIETY QUESTIONNAIRES
3. WORRYING TOO MUCH ABOUT DIFFERENT THINGS: NEARLY EVERY DAY
GAD7 TOTAL SCORE: 16
5. BEING SO RESTLESS THAT IT IS HARD TO SIT STILL: MORE THAN HALF THE DAYS
1. FEELING NERVOUS, ANXIOUS, OR ON EDGE: MORE THAN HALF THE DAYS
2. NOT BEING ABLE TO STOP OR CONTROL WORRYING: NEARLY EVERY DAY
6. BECOMING EASILY ANNOYED OR IRRITABLE: SEVERAL DAYS
IF YOU CHECKED OFF ANY PROBLEMS ON THIS QUESTIONNAIRE, HOW DIFFICULT HAVE THESE PROBLEMS MADE IT FOR YOU TO DO YOUR WORK, TAKE CARE OF THINGS AT HOME, OR GET ALONG WITH OTHER PEOPLE: SOMEWHAT DIFFICULT
7. FEELING AFRAID AS IF SOMETHING AWFUL MIGHT HAPPEN: MORE THAN HALF THE DAYS

## 2021-11-11 ASSESSMENT — MIFFLIN-ST. JEOR: SCORE: 1721.39

## 2021-11-11 ASSESSMENT — PATIENT HEALTH QUESTIONNAIRE - PHQ9: 5. POOR APPETITE OR OVEREATING: NEARLY EVERY DAY

## 2021-11-11 NOTE — PATIENT INSTRUCTIONS
Preventive Health Recommendations    Male Ages 40 to 49    Yearly exam:             See your health care provider every year in order to  o   Review health changes.   o   Discuss preventive care.    o   Review your medicines if your doctor has prescribed any.    You should be tested each year for STDs (sexually transmitted diseases) if you re at risk.     Have a cholesterol test every 5 years.     Have a colonoscopy (test for colon cancer) if someone in your family has had colon cancer or polyps before age 50.     After age 45, have a diabetes test (fasting glucose). If you are at risk for diabetes, you should have this test every 3 years.      Talk with your health care provider about whether or not a prostate cancer screening test (PSA) is right for you.    Shots: Get a flu shot each year. Get a tetanus shot every 10 years.     Nutrition:    Eat at least 5 servings of fruits and vegetables daily.     Eat whole-grain bread, whole-wheat pasta and brown rice instead of white grains and rice.     Get adequate Calcium and Vitamin D.     Lifestyle    Exercise for at least 150 minutes a week (30 minutes a day, 5 days a week). This will help you control your weight and prevent disease.     Limit alcohol to one drink per day.     No smoking.     Wear sunscreen to prevent skin cancer.     See your dentist every six months for an exam and cleaning.

## 2021-11-11 NOTE — PROGRESS NOTES
SUBJECTIVE:   CC: Mr. Jimmy López III is a 41-year-old man who presents for preventative health visit.     Patient has been advised of split billing requirements and indicates understanding: Yes     1. Need for endoscopy?  - Reports a history of blood in vomit and stool  - Denies any more blood in stool  - Still occasionally has blood in vomit (quit drinking in 2019/2020)    2. Celiac disease, recent diagnosis  - Yesterday had some food contaminated with gluten... 4 hours later experiences globus sensation, abdominal pain, vomiting  - Stopped eating gluten    3. Dry skin involving the head of the penis?  - Sometimes bleeds  - Started a couple months ago  - When voids thinks done but then drips urine - started in 2007/2008  - Would be amenable to seeing urology  - No concern for sexually transmitted infection    4. Cigarette smoking  - Interesting in quitting  - Previously took bupropion with success    Healthy Habits:     Getting at least 3 servings of Calcium per day:  Yes    Bi-annual eye exam:  Yes    Dental care twice a year:  Yes    Sleep apnea or symptoms of sleep apnea:  None    Diet:  Gluten-free/reduced and Other    Frequency of exercise:  1 day/week    Duration of exercise:  15-30 minutes    Taking medications regularly:  Yes    Medication side effects:  None    PHQ-2 Total Score: 2    Additional concerns today:  Yes    Today's PHQ-2 Score:   PHQ-2 ( 1999 Pfizer) 11/11/2021   Q1: Little interest or pleasure in doing things 1   Q2: Feeling down, depressed or hopeless 1   PHQ-2 Score 2   Q1: Little interest or pleasure in doing things Several days   Q2: Feeling down, depressed or hopeless Several days   PHQ-2 Score 2   - Denies any active suicidal ideation,    Abuse: Current or Past(Physical, Sexual or Emotional)- No  Do you feel safe in your environment? Yes    Have you ever done Advance Care Planning? (For example, a Health Directive, POLST, or a discussion with a medical provider or your loved ones  about your wishes): No, advance care planning information given to patient to review.  Patient declined advance care planning discussion at this time.    Social History     Tobacco Use     Smoking status: Former Smoker     Packs/day: 0.25     Types: Cigarettes     Quit date: 4/15/2021     Years since quittin.5     Smokeless tobacco: Former User     Types: Chew     Tobacco comment: using patch, down to 5-6cigs /day as of 2020   Substance Use Topics     Alcohol use: Yes     If you drink alcohol do you typically have >3 drinks per day or >7 drinks per week? Yes    Alcohol Use 2021   Prescreen: >3 drinks/day or >7 drinks/week? Yes   AUDIT SCORE  15     AUDIT - Alcohol Use Disorders Identification Test - Reproduced from the World Health Organization Audit 2001 (Second Edition) 2021   1.  How often do you have a drink containing alcohol? 2 to 4 times a month   2.  How many drinks containing alcohol do you have on a typical day when you are drinking? 3 or 4   3.  How often do you have five or more drinks on one occasion? Less than monthly   4.  How often during the last year have you found that you were not able to stop drinking once you had started? Weekly   5.  How often during the last year have you failed to do what was normally expected of you because of drinking? Never   6.  How often during the last year have you needed a first drink in the morning to get yourself going after a heavy drinking session? Never   7.  How often during the last year have you had a feeling of guilt or remorse after drinking? Weekly   8.  How often during the last year have you been unable to remember what happened the night before because of your drinking? Less than monthly   9.  Have you or someone else been injured because of your drinking? No   10. Has a relative, friend, doctor or other health care worker been concerned about your drinking or suggested you cut down? Yes, during the last year   TOTAL SCORE 15     Last  "PSA: No results found for: PSA    Reviewed orders with patient. Reviewed health maintenance and updated orders accordingly - Yes    Reviewed and updated as needed this visit by clinical staff              Reviewed and updated as needed this visit by Provider               Review of Systems   Constitutional: Negative for chills and fever.   HENT: Positive for congestion. Negative for ear pain, hearing loss and sore throat.    Eyes: Negative for pain and visual disturbance.   Respiratory: Positive for shortness of breath. Negative for cough.    Cardiovascular: Negative for chest pain, palpitations and peripheral edema.   Gastrointestinal: Positive for heartburn. Negative for abdominal pain, constipation, diarrhea and hematochezia.   Genitourinary: Positive for genital sores and penile discharge. Negative for dysuria, frequency, hematuria, impotence and urgency.   Musculoskeletal: Positive for arthralgias, joint swelling and myalgias.   Skin: Positive for rash.   Neurological: Positive for weakness. Negative for dizziness, headaches and paresthesias.   Psychiatric/Behavioral: Negative for mood changes. The patient is nervous/anxious.      10-point review of systems negative except for as listed above or below.    OBJECTIVE:   /74   Pulse 100   Temp 98.3  F (36.8  C) (Oral)   Resp 20   Ht 1.803 m (5' 11\")   Wt 78.9 kg (174 lb)   BMI 24.27 kg/m       Physical Exam  GENERAL: healthy, alert, no distress  EYES: eyes grossly normal to inspection, PER, and conjunctivae and sclerae normal  RESP: lungs clear to auscultation - no rales, rhonchi, or wheezes  CV: regular rate and rhythm, normal S1 S2, no S3 or S4, no murmur, click or rub, no peripheral edema and peripheral pulses strong  ABDOMEN: soft, non-tender, no hepatosplenomegaly, no masses and bowel sounds normal  : normal-appearing penis without any dry skin, sores, or penile discharge  MS: no gross musculoskeletal defects noted, no edema  SKIN: no suspicious " lesions or rashes on visible skin  NEURO: normal gait, mentation intact and speech normal  PSYCH: mentation appears normal, affect normal/bright    Diagnostic Test Results: pending    ASSESSMENT/PLAN:   Mr. Jimmy López is a 41-year-old man with a history of anxiety/depression who presents for a routine annual physical exam. Hemodynamically, except for some mild tachycardia ( bpm), VS otherwise WNLs. Clinically, see below.     1. Depression  - Increase sertraline to 100 mg daily (from 50 mg daily)  - Start bupropion 150 mg BID (primarily for smoking cessation - see below)  - Will defer additional medication adjustments to the patient's psychiatrist with whom he reports having an appointment in the coming month.  - Agree with regular therapy sessions.  - Plan for follow up in 3 months.    2. Hematemesis  3. Reflux  - Gastroenterology referral for endoscopy  - Refilled pantoprazole 40 mg daily.  - Counseled patient that PPI is not intended to be used as a permanent medication, especially given the risk of adverse effects with chronic use.     4. Nicotine dependence  - Start bupropion 150 mg BID (as above) x3 months  - Anticipate quit date: Friday 11/19/2021     5. Screening for sexually transmitted diseases (STD)  - HIV  - Syphilis  - Urine Gonorrhea  - Urine Chlamydia     6. Screening for diabetes  - Hemoglobin A1c    7. Routine health maintenance  s/p Braxton and Braxton COVID vaccine on 04/07/2021  - Influenza vaccine  - Pfizer COVID vaccine booster  - Screening Hepatitis C      ICD-10-CM    1. Routine general medical examination at a health care facility  Z00.00 REVIEW OF HEALTH MAINTENANCE PROTOCOL ORDERS   2. Hematemesis, presence of nausea not specified  K92.0 Adult Gastro Ref - Procedure Only   3. Heartburn  R12 pantoprazole (PROTONIX) 40 MG EC tablet   4. Screening for diabetes mellitus  Z13.1 Hemoglobin A1c     Hemoglobin A1c   5. Nicotine dependence with current use  F17.200 buPROPion (WELLBUTRIN  SR) 150 MG 12 hr tablet   6. Mild major depression (H)  F32.0 sertraline (ZOLOFT) 50 MG tablet     DISCONTINUED: sertraline (ZOLOFT) 50 MG tablet   7. Routine screening for STI (sexually transmitted infection)  Z11.3 HIV Antigen Antibody Combo     Treponema Abs w Reflex to RPR and Titer     NEISSERIA GONORRHOEA PCR     CHLAMYDIA TRACHOMATIS PCR     HIV Antigen Antibody Combo     Treponema Abs w Reflex to RPR and Titer     NEISSERIA GONORRHOEA PCR     CHLAMYDIA TRACHOMATIS PCR   8. Need for hepatitis C screening test  Z11.59 Hepatitis C Screen Reflex to HCV RNA Quant and Genotype     Hepatitis C Screen Reflex to HCV RNA Quant and Genotype   9. Need for influenza vaccination  Z23 INFLUENZA VACCINE IM > 6 MONTHS VALENT IIV4 (AFLURIA/FLUZONE)   10. Need for COVID-19 vaccine  Z23 COVID-19,PF,PFIZER (12+ Yrs PURPLE LABEL)     COUNSELING:   Reviewed preventive health counseling, as reflected in patient instructions    Estimated body mass index is 23.14 kg/m  as calculated from the following:    Height as of 6/29/21: 1.829 m (6').    Weight as of 6/29/21: 77.4 kg (170 lb 10.2 oz).     He reports that he quit smoking about 6 months ago. His smoking use included cigarettes. He smoked 0.25 packs per day. He has quit using smokeless tobacco.  His smokeless tobacco use included chew.    Counseling Resources:  ATP IV Guidelines  Pooled Cohorts Equation Calculator  FRAX Risk Assessment  ICSI Preventive Guidelines  Dietary Guidelines for Americans, 2010  USDA's MyPlate  ASA Prophylaxis  Lung CA Screening    Canelo Almeida MD  PGY-4 Internal Medicine/Pediatrics  Pager [phone]  Thursday 11/11/2021  [facility]    Patient staffed with the attending physician, Dr. Gan.    I have seen the patient, discussed with the resident, was present during critical portion of visit, and was available to furnish services throughout the visit.  I agree with the history, physical and plan as documented above.    Meliza  MD Malik  Internal Medicine - Pediatrics

## 2021-11-12 LAB
C TRACH DNA SPEC QL NAA+PROBE: NEGATIVE
HCV AB SERPL QL IA: NONREACTIVE
HIV 1+2 AB+HIV1 P24 AG SERPL QL IA: NONREACTIVE
N GONORRHOEA DNA SPEC QL NAA+PROBE: NEGATIVE
T PALLIDUM AB SER QL: NONREACTIVE

## 2021-11-12 ASSESSMENT — PATIENT HEALTH QUESTIONNAIRE - PHQ9: SUM OF ALL RESPONSES TO PHQ QUESTIONS 1-9: 15

## 2021-11-12 ASSESSMENT — ANXIETY QUESTIONNAIRES: GAD7 TOTAL SCORE: 16

## 2021-11-22 DIAGNOSIS — Z11.59 ENCOUNTER FOR SCREENING FOR OTHER VIRAL DISEASES: ICD-10-CM

## 2021-12-10 ENCOUNTER — LAB (OUTPATIENT)
Dept: LAB | Facility: CLINIC | Age: 41
End: 2021-12-10
Attending: INTERNAL MEDICINE
Payer: COMMERCIAL

## 2021-12-10 DIAGNOSIS — Z11.59 ENCOUNTER FOR SCREENING FOR OTHER VIRAL DISEASES: ICD-10-CM

## 2021-12-10 PROCEDURE — U0005 INFEC AGEN DETEC AMPLI PROBE: HCPCS

## 2021-12-11 LAB — SARS-COV-2 RNA RESP QL NAA+PROBE: NEGATIVE

## 2021-12-14 ENCOUNTER — HOSPITAL ENCOUNTER (OUTPATIENT)
Facility: CLINIC | Age: 41
Discharge: HOME | End: 2021-12-14
Attending: INTERNAL MEDICINE | Admitting: INTERNAL MEDICINE
Payer: COMMERCIAL

## 2021-12-14 VITALS
OXYGEN SATURATION: 95 % | BODY MASS INDEX: 24.11 KG/M2 | WEIGHT: 178 LBS | SYSTOLIC BLOOD PRESSURE: 119 MMHG | HEIGHT: 72 IN | TEMPERATURE: 97.3 F | RESPIRATION RATE: 18 BRPM | HEART RATE: 71 BPM | DIASTOLIC BLOOD PRESSURE: 84 MMHG

## 2021-12-14 LAB — UPPER GI ENDOSCOPY: NORMAL

## 2021-12-14 PROCEDURE — 250N000009 HC RX 250: Performed by: INTERNAL MEDICINE

## 2021-12-14 PROCEDURE — 999N000099 HC STATISTIC MODERATE SEDATION < 10 MIN: Performed by: INTERNAL MEDICINE

## 2021-12-14 PROCEDURE — 250N000011 HC RX IP 250 OP 636: Performed by: INTERNAL MEDICINE

## 2021-12-14 PROCEDURE — 88305 TISSUE EXAM BY PATHOLOGIST: CPT | Mod: TC | Performed by: INTERNAL MEDICINE

## 2021-12-14 PROCEDURE — 43239 EGD BIOPSY SINGLE/MULTIPLE: CPT | Performed by: INTERNAL MEDICINE

## 2021-12-14 PROCEDURE — 88305 TISSUE EXAM BY PATHOLOGIST: CPT | Mod: 26 | Performed by: PATHOLOGY

## 2021-12-14 RX ORDER — NALOXONE HYDROCHLORIDE 0.4 MG/ML
0.4 INJECTION, SOLUTION INTRAMUSCULAR; INTRAVENOUS; SUBCUTANEOUS
Status: DISCONTINUED | OUTPATIENT
Start: 2021-12-14 | End: 2021-12-14 | Stop reason: HOSPADM

## 2021-12-14 RX ORDER — PROCHLORPERAZINE MALEATE 10 MG
10 TABLET ORAL EVERY 6 HOURS PRN
Status: DISCONTINUED | OUTPATIENT
Start: 2021-12-14 | End: 2021-12-14 | Stop reason: HOSPADM

## 2021-12-14 RX ORDER — LIDOCAINE 40 MG/G
CREAM TOPICAL
Status: DISCONTINUED | OUTPATIENT
Start: 2021-12-14 | End: 2021-12-14 | Stop reason: HOSPADM

## 2021-12-14 RX ORDER — ATROPINE SULFATE 0.4 MG/ML
0.4 AMPUL (ML) INJECTION
Status: DISCONTINUED | OUTPATIENT
Start: 2021-12-14 | End: 2021-12-14 | Stop reason: HOSPADM

## 2021-12-14 RX ORDER — ONDANSETRON 2 MG/ML
4 INJECTION INTRAMUSCULAR; INTRAVENOUS
Status: DISCONTINUED | OUTPATIENT
Start: 2021-12-14 | End: 2021-12-14 | Stop reason: HOSPADM

## 2021-12-14 RX ORDER — FENTANYL CITRATE 50 UG/ML
25-50 INJECTION, SOLUTION INTRAMUSCULAR; INTRAVENOUS
Status: DISCONTINUED | OUTPATIENT
Start: 2021-12-14 | End: 2021-12-14 | Stop reason: HOSPADM

## 2021-12-14 RX ORDER — NALOXONE HYDROCHLORIDE 0.4 MG/ML
0.2 INJECTION, SOLUTION INTRAMUSCULAR; INTRAVENOUS; SUBCUTANEOUS
Status: DISCONTINUED | OUTPATIENT
Start: 2021-12-14 | End: 2021-12-14 | Stop reason: HOSPADM

## 2021-12-14 RX ORDER — FLUMAZENIL 0.1 MG/ML
0.2 INJECTION, SOLUTION INTRAVENOUS
Status: DISCONTINUED | OUTPATIENT
Start: 2021-12-14 | End: 2021-12-14 | Stop reason: HOSPADM

## 2021-12-14 RX ORDER — FENTANYL CITRATE 50 UG/ML
50-100 INJECTION, SOLUTION INTRAMUSCULAR; INTRAVENOUS
Status: COMPLETED | OUTPATIENT
Start: 2021-12-14 | End: 2021-12-14

## 2021-12-14 RX ORDER — SIMETHICONE 40MG/0.6ML
133 SUSPENSION, DROPS(FINAL DOSAGE FORM)(ML) ORAL
Status: DISCONTINUED | OUTPATIENT
Start: 2021-12-14 | End: 2021-12-14 | Stop reason: HOSPADM

## 2021-12-14 RX ORDER — ONDANSETRON 2 MG/ML
4 INJECTION INTRAMUSCULAR; INTRAVENOUS EVERY 6 HOURS PRN
Status: DISCONTINUED | OUTPATIENT
Start: 2021-12-14 | End: 2021-12-14 | Stop reason: HOSPADM

## 2021-12-14 RX ORDER — EPINEPHRINE 1 MG/ML
0.1 INJECTION, SOLUTION INTRAMUSCULAR; SUBCUTANEOUS
Status: DISCONTINUED | OUTPATIENT
Start: 2021-12-14 | End: 2021-12-14 | Stop reason: HOSPADM

## 2021-12-14 RX ORDER — ONDANSETRON 4 MG/1
4 TABLET, ORALLY DISINTEGRATING ORAL EVERY 6 HOURS PRN
Status: DISCONTINUED | OUTPATIENT
Start: 2021-12-14 | End: 2021-12-14 | Stop reason: HOSPADM

## 2021-12-14 RX ADMIN — TOPICAL ANESTHETIC 0.5 ML: 200 SPRAY DENTAL; PERIODONTAL at 13:28

## 2021-12-14 RX ADMIN — FENTANYL CITRATE 100 MCG: 50 INJECTION, SOLUTION INTRAMUSCULAR; INTRAVENOUS at 13:27

## 2021-12-14 RX ADMIN — MIDAZOLAM 2 MG: 1 INJECTION INTRAMUSCULAR; INTRAVENOUS at 13:27

## 2021-12-14 ASSESSMENT — MIFFLIN-ST. JEOR: SCORE: 1755.4

## 2021-12-14 NOTE — H&P
Pre-Endoscopy History and Physical     Jimmy López III MRN# [id number]   YOB: 1980 Age: 41 year old     Date of Procedure: 12/14/2021  Primary care provider: Canelo Almeida  Type of Endoscopy: Gastroscopy with possible biopsy, possible dilation  Reason for Procedure: bleed  Type of Anesthesia Anticipated: Conscious Sedation    HPI:    Jimmy is a 41 year old male who will be undergoing the above procedure.      A history and physical has been performed. The patient's medications and allergies have been reviewed. The risks and benefits of the procedure and the sedation options and risks were discussed with the patient.  All questions were answered and informed consent was obtained.      He denies a personal or family history of anesthesia complications or bleeding disorders.     Patient Active Problem List   Diagnosis     Gluten intolerance     Depression, unspecified depression type     Gastroenteritis     Head contusion     Conjunctivitis     Avascular necrosis of hip, left (H)     Allergic rhinitis     Anxiety     Hyperlipidemia LDL goal <130     Lower back pain     Lumbar herniated disc     Sciatica     PTSD (post-traumatic stress disorder)     Nicotine dependence     Sexually transmitted Chlamydia trachomatis infection     Lumbosacral radiculopathy     Spinal stenosis of lumbar region without neurogenic claudication     Lumbar disc herniation with radiculopathy        Past Medical History:   Diagnosis Date     AVN (avascular necrosis of bone) (H)     left hip     Celiac disease 07/2020     Depression      Sciatica 05/2014        Past Surgical History:   Procedure Laterality Date     DISCECTOMY LUMBAR POSTERIOR MICROSCOPIC ONE LEVEL Left 6/29/2021    Procedure: Open left lumbar 5-sacral 1 hemilaminectomy and foraminotomy medial facetectomy, and microdiskectomy;  Surgeon: Rosie Araujo MD;  Location: [facility] OR     OTHER SURGICAL HISTORY Left 07/19/2018    left hip arthroscopic  chondroplastydiagnostic arthroscopy, chondroplasty,synovectomy  see media section for details     TOTAL HIP ARTHROPLASTY Left     2018     TOTAL HIP ARTHROPLASTY Left 2018    Dr Colbert at [facility]       Social History     Tobacco Use     Smoking status: Former Smoker     Packs/day: 0.25     Types: Cigarettes     Quit date: 4/15/2021     Years since quittin.6     Smokeless tobacco: Former User     Types: Chew     Tobacco comment: using patch, down to 5-6cigs /day as of 2020   Substance Use Topics     Alcohol use: Yes     Comment: 2-3 days per week       Family History   Problem Relation Age of Onset     Deep Vein Thrombosis (DVT) Mother      Deep Vein Thrombosis Mother      Crohn's Disease Cousin      Colon Cancer No family hx of      Irritable Bowel Syndrome No family hx of      Depression Mother      Diabetes Father      Cancer Maternal Grandmother         lung cancer       Prior to Admission medications    Medication Sig Start Date End Date Taking? Authorizing Provider   Ascorbic Acid (VITAMIN C) 100 MG CHEW Take 1 tablet by mouth daily   Yes Reported, Patient   buPROPion (WELLBUTRIN SR) 150 MG 12 hr tablet Take 1 tablet (150 mg) by mouth 2 times daily 21  Yes Meliza Gan MD   Cyanocobalamin (VITAMIN B 12 PO) Take by mouth every morning   Yes Reported, Patient   famotidine (PEPCID) 20 MG tablet Take 1 tablet (20 mg) by mouth 2 times daily as needed (heartburn) 21  Yes Karma Quintana DO   loratadine (CLARITIN) 10 MG tablet Take 10 mg by mouth daily   Yes Reported, Patient   Multiple Vitamins-Minerals (BIO-35 GLUTEN-FREE PO) Take by mouth daily   Yes Reported, Patient   pantoprazole (PROTONIX) 40 MG EC tablet Take 1 tablet (40 mg) by mouth daily 21  Yes Meliza Gan MD   sertraline (ZOLOFT) 50 MG tablet Take 2 tablets (100 mg) by mouth daily 21  Yes Meliza Gan MD   TURMERIC PO Take by mouth 3 times daily   Yes Reported,  "Patient       Allergies   Allergen Reactions     Varenicline      nigalethaares that persisted on this     Gluten Meal Nausea and Vomiting and Diarrhea     Patient states he feels like he is going to die , sometimes has blood in the vomit .     Pollen Extract Difficulty breathing     hayfever        REVIEW OF SYSTEMS:   5 point ROS negative except as noted above in HPI, including Gen., Resp., CV, GI &  system review.    PHYSICAL EXAM:   There were no vitals taken for this visit. Estimated body mass index is 24.27 kg/m  as calculated from the following:    Height as of 11/11/21: 1.803 m (5' 11\").    Weight as of 11/11/21: 78.9 kg (174 lb).   GENERAL APPEARANCE: alert, and oriented  MENTAL STATUS: alert  AIRWAY EXAM: Mallampatti Class I (visualization of the soft palate, fauces, uvula, anterior and posterior pillars)  RESP: lungs clear to auscultation - no rales, rhonchi or wheezes  CV: regular rates and rhythm  DIAGNOSTICS:    Not indicated    IMPRESSION   ASA Class 2 - Mild systemic disease    PLAN:   Plan for Gastroscopy with possible biopsy, possible dilation. We discussed the risks, benefits and alternatives and the patient wished to proceed.    The above has been forwarded to the consulting provider.      Signed Electronically by: Norris Can MD  December 14, 2021          "

## 2021-12-14 NOTE — DISCHARGE INSTRUCTIONS
The patient has received a copy of the Provation  report the doctor has written and discharge instructions have been discussed with the patient and responsible adult.  All questions were addressed and answered prior to patient discharge.      Understanding H. pylori and Ulcers  Traditionally, ulcers, or sores in the lining of your digestive tract, were thought to be caused by too much spicy food, stress, or an anxious personality. We now know that most ulcers are probably due to infection with bacteria known as Helicobacter pylori (H. pylori).     H. pylori invade and disturb the lining of the digestive tract. Acid may weaken the area, causing an ulcer.      Common Ulcer Symptoms  Burning, cramping, or hunger-like pain in the stomach area, often one to three hours after a meal or in the middle of the night  Pain that gets better or worse with eating  Nausea or vomiting  Black, tarry, or bloody stools (which means the ulcer is bleeding)  Or you may have no symptoms.     An ulcer can form in two areas of the digestive tract; the stomach and the duodenum (where the stomach meets the small intestine).      Your Evaluation  An evaluation by your doctor can show if you have an ulcer and determine whether it was caused by H. pylori. Your doctor may ask you questions, examine you, and possibly do some tests. These may include:  A special X-ray called a barium upper gastrointestinal series, to help locate an ulcer. During the test, you drink a chalky liquid. This liquid helps the ulcer show up on the X-ray.  An endoscopic exam, done with a long tube passed through your mouth into your stomach, to give the doctor a closer look at your ulcer. You will be lightly sedated for this procedure. Your doctor can also take a tissue sample to test for H. pylori.  Blood, stool, and breath tests are also available to show whether you have H. pylori in your digestive tract.  Your Treatment  To kill H. pylori so your ulcer can heal, your  doctor will probably prescribe antibiotics. Other ulcer medications that help reduce stomach acid may also be prescribed as well. Testing after treatment is recommended to be sure the H. pylori infection is gone. Usually, killing H. pylori helps keep the ulcer from returning.    [id number] [employer] [facility], [address] [place] [address], [place] [address]. All rights reserved. This information is not intended as a substitute for professional medical care. Always follow your healthcare professional's instructions.

## 2021-12-14 NOTE — LETTER
November 22, 2021      Jimmy López III  [address]  [facility] [address]        Dear Jimmy,     Thank you for choosing [facility]. You are scheduled for the following service(s).   Please be aware that coverage of these services is subject to the terms and limitations of your health insurance plan.  Call member services at your health plan with any benefit or coverage questions.    Date:   Tuesday December 14, 2021      Procedure: UPPER ENDOSCOPY-EGD  Doctor: Norris Can MD           Arrival Time:  01:45pm   *Enter and check in at the Main Hospital Entrance  Procedure Time: 02:30pm     Location:   [facility]        Endoscopy Department, First Floor *         [address] [place], [place] [address] [phone] or [phone] () to reschedule            PRE-PROCEDURE CHECKLIST    If you have diabetes, ask your regular doctor for diet and medication restrictions.  If you take any antiplatelet or anticoagulant medications (such as Coumadin, Lovenox, Plavix, etc.) and have not already discussed this, please call your primary physician for advice on holding this medication.  If you take Aspirin, you may continue to do so.  If you are or may be pregnant, please discuss the risks and benefits of this procedure with your doctor.  You must arrange for a ride for the day of your exam. If you fail to arrange transportation with a responsible adult, your procedure will need to be cancelled and rescheduled. Taxi, bus and medical transport are not acceptable unless you have a responsible adult that you know & trust with you. Please arrange for this  to be able to pick you up in our department, approximately one hour after your scheduled procedure, if they are not able to stay with you.      Canceling or rescheduling   If you must cancel or reschedule your appointment, please call [phone] as soon as possible.      Upper Endoscopy or  Esophagogastroduodenoscopy (EGD) is a test performed to evaluate symptoms of persistent abdominal pain, nausea, vomiting and difficulty swallowing. It may also be used to treat various conditions of the upper gastrointestinal tract, such as bleeding, narrowing or abnormal growths.     What happens during an upper endoscopy?  On the day of your procedure, plan to spend up to one and a half hour after your arrival at the endoscopy center. The exam itself takes about 5 to 10 minutes.    Before the exam:  - You will change into a gown.   - Your medical history and medication list will be reviewed with you, unless it has already been done over the phone.   - A nurse will insert an intravenous (IV) line into your hand or arm.  - The doctor will talk to you and give you a consent form to sign.    During the exam:  - Medicine will be given through the IV line to help you relax and feel comfortable.   - Your heart rate and oxygen levels will be monitored. If your blood pressure is low, you may be given fluids through the IV line.   - The doctor will insert a flexible, hollow tube, called an endoscope, into your mouth and will advance it slowly through the esophagus, stomach and duodenum (the first part of your small intestine).   - You may have a feeling of pressure or fullness.   - If you have difficulty swallowing, and the doctor finds a narrowing in your esophagus, it may be possible for the area to be expanded-dilated during the exam.   - If abnormal tissue is found, the doctor may remove it through the endoscope (biopsy it) for closer examination. The tissue removal is painless.    After the exam:  - Any tissue samples removed during the exam will be sent to a lab for evaluation. It may take 5 to 7 working days for you to be notified of the results  - The doctor will prepare a full report for the physician who referred you for the upper endoscopy.   - The doctor will talk with you about the initial results of your exam.    - You may feel bloated after the procedure. That is normal and should not last long.   - Your throat may feel sore for a short time.   - Following the exam, you may resume your normal diet. Avoid alcohol until the next day.   - You may resume your regular activities the day after the procedure.   - Medication given during the exam will prohibit you from driving for the rest of the day.  - A nurse will provide you with complete discharge instructions before you leave the endoscopy center. Be sure to ask the nurse for specific instructions if you take blood thinners such as Aspirin , Coumadin , Lovenox , Plavix , etc.       PREPARATION    To ensure a successful exam, please follow all instructions carefully.      The night before your exam:    STOP eating solid foods at 11:45 pm.     Clear liquids are okay to drink (examples: Gatorade , apple juice, clear broth,coffee or tea without milk or cream, etc.).     DO NOT drink red liquids or alcoholic beverages.    The day of your exam:    STOP drinking clear liquids 4 hours before your exam.     You may take your usual medications with 4 oz. of water, but it needs to be at least 4 hours prior to your procedure.    When you leave for the procedure:    Bring a list of all of your current medications, including any allergy or over-the-counter medications, unless you have already reviewed that with an Endoscopy RN over the phone.     Bring a photo ID as well as up-to-date insurance information, such as your insurance card and any referral forms that might be required by your payer.       DIRECTIONS TO THE ENDOSCOPY DEPARTMENT    From the north ([facility])  Take [address] South, exit on [address] . Get into the left hand fatmata, turn left (east), go one-half mile to [address] and turn left. Go north to the second stoplight, take a right on [address] and follow it to the Main Hospital entrance.  From the south ([facility])  Take  [address]N to the [address]E split and exit on [address] . On [address] , turn left (west) to [address]. Turn right (north) on [address]. Go north to the second stoplight, take a right on [address] and follow it to the Main Hospital entrance.  From the east via 35E ([facility])  Take [address]E south to [address]  exit. Turn right on [address] . Go west to [address]. Turn right (north) on [address]. Go to the second stoplight, take a right on [address] to the Main Hospital entrance.  From the east via [address] ([facility])  Take [address] to [address]. Turn left (south) on [address] to [address], turn left (east) on [address] and follow it to the Main Hospital entrance.    From the west via [address] ([place])  Take [address] east to [address]. Turn right (south) on [address] to [address], turn left (east) on [address] and follow it to the Main Hospital entrance.

## 2021-12-15 LAB
PATH REPORT.COMMENTS IMP SPEC: NORMAL
PATH REPORT.COMMENTS IMP SPEC: NORMAL
PATH REPORT.FINAL DX SPEC: NORMAL
PATH REPORT.GROSS SPEC: NORMAL
PATH REPORT.MICROSCOPIC SPEC OTHER STN: NORMAL
PATH REPORT.RELEVANT HX SPEC: NORMAL
PHOTO IMAGE: NORMAL

## 2022-08-26 ENCOUNTER — OFFICE VISIT (OUTPATIENT)
Dept: FAMILY MEDICINE | Facility: CLINIC | Age: 42
End: 2022-08-26
Payer: COMMERCIAL

## 2022-08-26 VITALS
DIASTOLIC BLOOD PRESSURE: 80 MMHG | HEIGHT: 72 IN | BODY MASS INDEX: 24.27 KG/M2 | WEIGHT: 179.2 LBS | SYSTOLIC BLOOD PRESSURE: 120 MMHG | HEART RATE: 91 BPM | OXYGEN SATURATION: 97 % | TEMPERATURE: 98.1 F

## 2022-08-26 DIAGNOSIS — K90.41 GLUTEN INTOLERANCE: ICD-10-CM

## 2022-08-26 DIAGNOSIS — R11.2 NAUSEA AND VOMITING, INTRACTABILITY OF VOMITING NOT SPECIFIED, UNSPECIFIED VOMITING TYPE: ICD-10-CM

## 2022-08-26 DIAGNOSIS — F41.9 ANXIETY: ICD-10-CM

## 2022-08-26 DIAGNOSIS — Z00.00 ROUTINE GENERAL MEDICAL EXAMINATION AT A HEALTH CARE FACILITY: Primary | ICD-10-CM

## 2022-08-26 DIAGNOSIS — F33.1 MODERATE EPISODE OF RECURRENT MAJOR DEPRESSIVE DISORDER (H): ICD-10-CM

## 2022-08-26 DIAGNOSIS — F17.210 CIGARETTE NICOTINE DEPENDENCE WITHOUT COMPLICATION: ICD-10-CM

## 2022-08-26 PROBLEM — M87.052 AVASCULAR NECROSIS OF HIP, LEFT (H): Status: RESOLVED | Noted: 2018-05-22 | Resolved: 2022-08-26

## 2022-08-26 PROBLEM — A56.8 SEXUALLY TRANSMITTED CHLAMYDIA TRACHOMATIS INFECTION: Status: RESOLVED | Noted: 2020-12-14 | Resolved: 2022-08-26

## 2022-08-26 PROBLEM — H10.9 CONJUNCTIVITIS: Status: RESOLVED | Noted: 2020-12-14 | Resolved: 2022-08-26

## 2022-08-26 PROBLEM — F33.9 MAJOR DEPRESSION, RECURRENT (H): Status: ACTIVE | Noted: 2022-08-26

## 2022-08-26 LAB
BASOPHILS # BLD AUTO: 0.1 10E3/UL (ref 0–0.2)
BASOPHILS NFR BLD AUTO: 1 %
EOSINOPHIL # BLD AUTO: 0.1 10E3/UL (ref 0–0.7)
EOSINOPHIL NFR BLD AUTO: 1 %
ERYTHROCYTE [DISTWIDTH] IN BLOOD BY AUTOMATED COUNT: 14.7 % (ref 10–15)
FOLATE SERPL-MCNC: 4.3 NG/ML (ref 4.6–34.8)
HCT VFR BLD AUTO: 45.7 % (ref 40–53)
HGB BLD-MCNC: 14.9 G/DL (ref 13.3–17.7)
LYMPHOCYTES # BLD AUTO: 1.9 10E3/UL (ref 0.8–5.3)
LYMPHOCYTES NFR BLD AUTO: 24 %
MCH RBC QN AUTO: 29.8 PG (ref 26.5–33)
MCHC RBC AUTO-ENTMCNC: 32.6 G/DL (ref 31.5–36.5)
MCV RBC AUTO: 91 FL (ref 78–100)
MONOCYTES # BLD AUTO: 0.5 10E3/UL (ref 0–1.3)
MONOCYTES NFR BLD AUTO: 7 %
NEUTROPHILS # BLD AUTO: 5.4 10E3/UL (ref 1.6–8.3)
NEUTROPHILS NFR BLD AUTO: 68 %
PLATELET # BLD AUTO: 305 10E3/UL (ref 150–450)
RBC # BLD AUTO: 5 10E6/UL (ref 4.4–5.9)
VIT B12 SERPL-MCNC: 415 PG/ML (ref 232–1245)
WBC # BLD AUTO: 8 10E3/UL (ref 4–11)

## 2022-08-26 PROCEDURE — 82746 ASSAY OF FOLIC ACID SERUM: CPT | Performed by: FAMILY MEDICINE

## 2022-08-26 PROCEDURE — 80053 COMPREHEN METABOLIC PANEL: CPT | Performed by: FAMILY MEDICINE

## 2022-08-26 PROCEDURE — 36415 COLL VENOUS BLD VENIPUNCTURE: CPT | Performed by: FAMILY MEDICINE

## 2022-08-26 PROCEDURE — 99214 OFFICE O/P EST MOD 30 MIN: CPT | Mod: 25 | Performed by: FAMILY MEDICINE

## 2022-08-26 PROCEDURE — 82607 VITAMIN B-12: CPT | Performed by: FAMILY MEDICINE

## 2022-08-26 PROCEDURE — 85025 COMPLETE CBC W/AUTO DIFF WBC: CPT | Performed by: FAMILY MEDICINE

## 2022-08-26 PROCEDURE — 99396 PREV VISIT EST AGE 40-64: CPT | Performed by: FAMILY MEDICINE

## 2022-08-26 RX ORDER — NICOTINE 21 MG/24HR
1 PATCH, TRANSDERMAL 24 HOURS TRANSDERMAL EVERY 24 HOURS
Qty: 84 PATCH | Refills: 1 | Status: SHIPPED | OUTPATIENT
Start: 2022-08-26

## 2022-08-26 SDOH — ECONOMIC STABILITY: FOOD INSECURITY: WITHIN THE PAST 12 MONTHS, THE FOOD YOU BOUGHT JUST DIDN'T LAST AND YOU DIDN'T HAVE MONEY TO GET MORE.: SOMETIMES TRUE

## 2022-08-26 SDOH — ECONOMIC STABILITY: INCOME INSECURITY: IN THE LAST 12 MONTHS, WAS THERE A TIME WHEN YOU WERE NOT ABLE TO PAY THE MORTGAGE OR RENT ON TIME?: NO

## 2022-08-26 SDOH — ECONOMIC STABILITY: TRANSPORTATION INSECURITY
IN THE PAST 12 MONTHS, HAS LACK OF TRANSPORTATION KEPT YOU FROM MEETINGS, WORK, OR FROM GETTING THINGS NEEDED FOR DAILY LIVING?: NO

## 2022-08-26 SDOH — HEALTH STABILITY: PHYSICAL HEALTH: ON AVERAGE, HOW MANY DAYS PER WEEK DO YOU ENGAGE IN MODERATE TO STRENUOUS EXERCISE (LIKE A BRISK WALK)?: 2 DAYS

## 2022-08-26 SDOH — ECONOMIC STABILITY: TRANSPORTATION INSECURITY
IN THE PAST 12 MONTHS, HAS THE LACK OF TRANSPORTATION KEPT YOU FROM MEDICAL APPOINTMENTS OR FROM GETTING MEDICATIONS?: NO

## 2022-08-26 SDOH — ECONOMIC STABILITY: INCOME INSECURITY: HOW HARD IS IT FOR YOU TO PAY FOR THE VERY BASICS LIKE FOOD, HOUSING, MEDICAL CARE, AND HEATING?: NOT VERY HARD

## 2022-08-26 SDOH — ECONOMIC STABILITY: FOOD INSECURITY: WITHIN THE PAST 12 MONTHS, YOU WORRIED THAT YOUR FOOD WOULD RUN OUT BEFORE YOU GOT MONEY TO BUY MORE.: SOMETIMES TRUE

## 2022-08-26 SDOH — HEALTH STABILITY: PHYSICAL HEALTH: ON AVERAGE, HOW MANY MINUTES DO YOU ENGAGE IN EXERCISE AT THIS LEVEL?: 90 MIN

## 2022-08-26 ASSESSMENT — ENCOUNTER SYMPTOMS
NAUSEA: 0
FREQUENCY: 1
HEADACHES: 0
SORE THROAT: 0
WEAKNESS: 0
HEARTBURN: 1
ARTHRALGIAS: 1
CHILLS: 0
CONSTIPATION: 1
MYALGIAS: 1
NERVOUS/ANXIOUS: 1
COUGH: 0
EYE PAIN: 0
PALPITATIONS: 0
PARESTHESIAS: 0
FEVER: 0
JOINT SWELLING: 1
DYSURIA: 0
HEMATOCHEZIA: 1
ABDOMINAL PAIN: 0
DIARRHEA: 1
SHORTNESS OF BREATH: 0
DIZZINESS: 0
HEMATURIA: 0

## 2022-08-26 ASSESSMENT — SOCIAL DETERMINANTS OF HEALTH (SDOH)
ARE YOU MARRIED, WIDOWED, DIVORCED, SEPARATED, NEVER MARRIED, OR LIVING WITH A PARTNER?: LIVING WITH PARTNER
IN A TYPICAL WEEK, HOW MANY TIMES DO YOU TALK ON THE PHONE WITH FAMILY, FRIENDS, OR NEIGHBORS?: MORE THAN THREE TIMES A WEEK
DO YOU BELONG TO ANY CLUBS OR ORGANIZATIONS SUCH AS CHURCH GROUPS UNIONS, FRATERNAL OR ATHLETIC GROUPS, OR SCHOOL GROUPS?: YES
HOW OFTEN DO YOU GET TOGETHER WITH FRIENDS OR RELATIVES?: ONCE A WEEK
HOW OFTEN DO YOU ATTEND CHURCH OR RELIGIOUS SERVICES?: MORE THAN 4 TIMES PER YEAR

## 2022-08-26 ASSESSMENT — PATIENT HEALTH QUESTIONNAIRE - PHQ9
10. IF YOU CHECKED OFF ANY PROBLEMS, HOW DIFFICULT HAVE THESE PROBLEMS MADE IT FOR YOU TO DO YOUR WORK, TAKE CARE OF THINGS AT HOME, OR GET ALONG WITH OTHER PEOPLE: SOMEWHAT DIFFICULT
SUM OF ALL RESPONSES TO PHQ QUESTIONS 1-9: 12
SUM OF ALL RESPONSES TO PHQ QUESTIONS 1-9: 12

## 2022-08-26 ASSESSMENT — LIFESTYLE VARIABLES
HOW OFTEN DO YOU HAVE A DRINK CONTAINING ALCOHOL: 4 OR MORE TIMES A WEEK
SKIP TO QUESTIONS 9-10: 0
HOW MANY STANDARD DRINKS CONTAINING ALCOHOL DO YOU HAVE ON A TYPICAL DAY: 7 TO 9
HOW OFTEN DO YOU HAVE SIX OR MORE DRINKS ON ONE OCCASION: DAILY OR ALMOST DAILY
AUDIT-C TOTAL SCORE: 11

## 2022-08-26 NOTE — PATIENT INSTRUCTIONS
[facility]           Preventive Health Recommendations  Male Ages 40 to 49    Yearly exam:             See your health care provider every year in order to  o   Review health changes.   o   Discuss preventive care.    o   Review your medicines if your doctor has prescribed any.  You should be tested each year for STDs (sexually transmitted diseases) if you re at risk.   Have a cholesterol test every 5 years.   Have a colonoscopy (test for colon cancer) if someone in your family has had colon cancer or polyps before age 50.   After age 45, have a diabetes test (fasting glucose). If you are at risk for diabetes, you should have this test every 3 years.    Talk with your health care provider about whether or not a prostate cancer screening test (PSA) is right for you.    Shots: Get a flu shot each year. Get a tetanus shot every 10 years.     Nutrition:  Eat at least 5 servings of fruits and vegetables daily.   Eat whole-grain bread, whole-wheat pasta and brown rice instead of white grains and rice.   Get adequate Calcium and Vitamin D.     Lifestyle  Exercise for at least 150 minutes a week (30 minutes a day, 5 days a week). This will help you control your weight and prevent disease.   Limit alcohol to one drink per day.   No smoking.   Wear sunscreen to prevent skin cancer.   See your dentist every six months for an exam and cleaning.
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
## 2022-08-27 LAB
ALBUMIN SERPL-MCNC: 3.9 G/DL (ref 3.4–5)
ALP SERPL-CCNC: 86 U/L (ref 40–150)
ALT SERPL W P-5'-P-CCNC: 49 U/L (ref 0–70)
ANION GAP SERPL CALCULATED.3IONS-SCNC: 3 MMOL/L (ref 3–14)
AST SERPL W P-5'-P-CCNC: 35 U/L (ref 0–45)
BILIRUB SERPL-MCNC: 0.2 MG/DL (ref 0.2–1.3)
BUN SERPL-MCNC: 19 MG/DL (ref 7–30)
CALCIUM SERPL-MCNC: 9.7 MG/DL (ref 8.5–10.1)
CHLORIDE BLD-SCNC: 104 MMOL/L (ref 94–109)
CO2 SERPL-SCNC: 30 MMOL/L (ref 20–32)
CREAT SERPL-MCNC: 1.26 MG/DL (ref 0.66–1.25)
GFR SERPL CREATININE-BSD FRML MDRD: 73 ML/MIN/1.73M2
GLUCOSE BLD-MCNC: 99 MG/DL (ref 70–99)
POTASSIUM BLD-SCNC: 4.5 MMOL/L (ref 3.4–5.3)
PROT SERPL-MCNC: 7.7 G/DL (ref 6.8–8.8)
SODIUM SERPL-SCNC: 137 MMOL/L (ref 133–144)

## 2022-09-01 DIAGNOSIS — E53.8 LOW FOLIC ACID: Primary | ICD-10-CM

## 2022-09-01 RX ORDER — LANOLIN ALCOHOL/MO/W.PET/CERES
400 CREAM (GRAM) TOPICAL DAILY
Qty: 90 TABLET | Refills: 0 | Status: SHIPPED | OUTPATIENT
Start: 2022-09-01

## 2022-09-10 ENCOUNTER — HEALTH MAINTENANCE LETTER (OUTPATIENT)
Age: 42
End: 2022-09-10

## 2023-10-01 ENCOUNTER — HEALTH MAINTENANCE LETTER (OUTPATIENT)
Age: 43
End: 2023-10-01

## 2024-11-24 ENCOUNTER — HEALTH MAINTENANCE LETTER (OUTPATIENT)
Age: 44
End: 2024-11-24

## 2025-04-05 ENCOUNTER — HOSPITAL ENCOUNTER (EMERGENCY)
Facility: CLINIC | Age: 45
Discharge: HOME | End: 2025-04-05
Attending: EMERGENCY MEDICINE | Admitting: EMERGENCY MEDICINE
Payer: COMMERCIAL

## 2025-04-05 ENCOUNTER — APPOINTMENT (OUTPATIENT)
Dept: GENERAL RADIOLOGY | Facility: CLINIC | Age: 45
End: 2025-04-05
Attending: EMERGENCY MEDICINE
Payer: COMMERCIAL

## 2025-04-05 VITALS
TEMPERATURE: 97.7 F | OXYGEN SATURATION: 97 % | HEART RATE: 70 BPM | WEIGHT: 175 LBS | BODY MASS INDEX: 23.7 KG/M2 | RESPIRATION RATE: 15 BRPM | DIASTOLIC BLOOD PRESSURE: 64 MMHG | HEIGHT: 72 IN | SYSTOLIC BLOOD PRESSURE: 104 MMHG

## 2025-04-05 DIAGNOSIS — R07.9 CHEST PAIN, UNSPECIFIED TYPE: ICD-10-CM

## 2025-04-05 LAB
ANION GAP SERPL CALCULATED.3IONS-SCNC: 13 MMOL/L (ref 7–15)
BASOPHILS # BLD AUTO: 0.1 10E3/UL (ref 0–0.2)
BASOPHILS NFR BLD AUTO: 1 %
BUN SERPL-MCNC: 15 MG/DL (ref 6–20)
CALCIUM SERPL-MCNC: 9.8 MG/DL (ref 8.8–10.4)
CHLORIDE SERPL-SCNC: 101 MMOL/L (ref 98–107)
CREAT SERPL-MCNC: 1.32 MG/DL (ref 0.67–1.17)
D DIMER PPP FEU-MCNC: <0.27 UG/ML FEU (ref 0–0.5)
EGFRCR SERPLBLD CKD-EPI 2021: 68 ML/MIN/1.73M2
EOSINOPHIL # BLD AUTO: 0.1 10E3/UL (ref 0–0.7)
EOSINOPHIL NFR BLD AUTO: 1 %
ERYTHROCYTE [DISTWIDTH] IN BLOOD BY AUTOMATED COUNT: 13.8 % (ref 10–15)
FLUAV RNA SPEC QL NAA+PROBE: NEGATIVE
FLUBV RNA RESP QL NAA+PROBE: NEGATIVE
GLUCOSE SERPL-MCNC: 76 MG/DL (ref 70–99)
HCO3 SERPL-SCNC: 27 MMOL/L (ref 22–29)
HCT VFR BLD AUTO: 40.1 % (ref 40–53)
HGB BLD-MCNC: 13.1 G/DL (ref 13.3–17.7)
HOLD SPECIMEN: NORMAL
HOLD SPECIMEN: NORMAL
IMM GRANULOCYTES # BLD: 0 10E3/UL
IMM GRANULOCYTES NFR BLD: 0 %
LYMPHOCYTES # BLD AUTO: 2.4 10E3/UL (ref 0.8–5.3)
LYMPHOCYTES NFR BLD AUTO: 31 %
MCH RBC QN AUTO: 27.5 PG (ref 26.5–33)
MCHC RBC AUTO-ENTMCNC: 32.7 G/DL (ref 31.5–36.5)
MCV RBC AUTO: 84 FL (ref 78–100)
MONOCYTES # BLD AUTO: 0.4 10E3/UL (ref 0–1.3)
MONOCYTES NFR BLD AUTO: 6 %
NEUTROPHILS # BLD AUTO: 4.7 10E3/UL (ref 1.6–8.3)
NEUTROPHILS NFR BLD AUTO: 61 %
NRBC # BLD AUTO: 0 10E3/UL
NRBC BLD AUTO-RTO: 0 /100
PLATELET # BLD AUTO: 299 10E3/UL (ref 150–450)
POTASSIUM SERPL-SCNC: 3.6 MMOL/L (ref 3.4–5.3)
RBC # BLD AUTO: 4.76 10E6/UL (ref 4.4–5.9)
RSV RNA SPEC NAA+PROBE: NEGATIVE
SARS-COV-2 RNA RESP QL NAA+PROBE: NEGATIVE
SODIUM SERPL-SCNC: 141 MMOL/L (ref 135–145)
TROPONIN T SERPL HS-MCNC: 8 NG/L
TROPONIN T SERPL HS-MCNC: 8 NG/L
WBC # BLD AUTO: 7.7 10E3/UL (ref 4–11)

## 2025-04-05 PROCEDURE — 85379 FIBRIN DEGRADATION QUANT: CPT | Performed by: EMERGENCY MEDICINE

## 2025-04-05 PROCEDURE — 85025 COMPLETE CBC W/AUTO DIFF WBC: CPT | Performed by: EMERGENCY MEDICINE

## 2025-04-05 PROCEDURE — 250N000013 HC RX MED GY IP 250 OP 250 PS 637: Performed by: EMERGENCY MEDICINE

## 2025-04-05 PROCEDURE — 93005 ELECTROCARDIOGRAM TRACING: CPT

## 2025-04-05 PROCEDURE — 87637 SARSCOV2&INF A&B&RSV AMP PRB: CPT | Performed by: EMERGENCY MEDICINE

## 2025-04-05 PROCEDURE — 84484 ASSAY OF TROPONIN QUANT: CPT | Performed by: EMERGENCY MEDICINE

## 2025-04-05 PROCEDURE — 85014 HEMATOCRIT: CPT | Performed by: EMERGENCY MEDICINE

## 2025-04-05 PROCEDURE — 99285 EMERGENCY DEPT VISIT HI MDM: CPT | Mod: 25

## 2025-04-05 PROCEDURE — 36415 COLL VENOUS BLD VENIPUNCTURE: CPT | Performed by: EMERGENCY MEDICINE

## 2025-04-05 PROCEDURE — 250N000011 HC RX IP 250 OP 636: Performed by: EMERGENCY MEDICINE

## 2025-04-05 PROCEDURE — 71046 X-RAY EXAM CHEST 2 VIEWS: CPT

## 2025-04-05 PROCEDURE — 80048 BASIC METABOLIC PNL TOTAL CA: CPT | Performed by: EMERGENCY MEDICINE

## 2025-04-05 PROCEDURE — 85004 AUTOMATED DIFF WBC COUNT: CPT | Performed by: EMERGENCY MEDICINE

## 2025-04-05 PROCEDURE — 96374 THER/PROPH/DIAG INJ IV PUSH: CPT

## 2025-04-05 RX ORDER — ONDANSETRON 2 MG/ML
4 INJECTION INTRAMUSCULAR; INTRAVENOUS ONCE
Status: COMPLETED | OUTPATIENT
Start: 2025-04-05 | End: 2025-04-05

## 2025-04-05 RX ORDER — LIDOCAINE 4 G/G
2 PATCH TOPICAL ONCE
Status: DISCONTINUED | OUTPATIENT
Start: 2025-04-05 | End: 2025-04-06 | Stop reason: HOSPADM

## 2025-04-05 RX ADMIN — ONDANSETRON 4 MG: 2 INJECTION, SOLUTION INTRAMUSCULAR; INTRAVENOUS at 21:10

## 2025-04-05 RX ADMIN — LIDOCAINE 2 PATCH: 4 PATCH TOPICAL at 22:11

## 2025-04-05 ASSESSMENT — COLUMBIA-SUICIDE SEVERITY RATING SCALE - C-SSRS
2. HAVE YOU ACTUALLY HAD ANY THOUGHTS OF KILLING YOURSELF IN THE PAST MONTH?: NO
6. HAVE YOU EVER DONE ANYTHING, STARTED TO DO ANYTHING, OR PREPARED TO DO ANYTHING TO END YOUR LIFE?: NO
1. IN THE PAST MONTH, HAVE YOU WISHED YOU WERE DEAD OR WISHED YOU COULD GO TO SLEEP AND NOT WAKE UP?: NO

## 2025-04-05 ASSESSMENT — ACTIVITIES OF DAILY LIVING (ADL)
ADLS_ACUITY_SCORE: 41

## 2025-04-06 NOTE — ED PROVIDER NOTES
Emergency Department Note      History of Present Illness     Chief Complaint   Chest Pain      HPI   Jimmy López III is a 45 year old male who presents to the ED with his wife for evaluation of chest pain. 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 The pain gradually worsened throughout the day and has been unrelenting. Notes it is a crushing pain. Endorses pain while breathing. Patient is currently having chest discomfort. Denies shortness of breath, fever, rhinorrhea, cough, abdominal pain, or chest pain with exertion. Patient is regularly active. Notes occasionally having chest soreness after lifting weights. Patient is a former smoker, quit approximately 3 years ago. Patient's mom has a history of stroke.     Independent Historian   None    Review of External Notes   None    Past Medical History     Medical History and Problem List   AVN  Celiac disease  Depression  Sciatica   Allergic rhinitis  Anxiety  Hyperlipidemia  PTSD  Nicotine dependence  Lumbosacral radiculopathy    Medications   Ascorbic Acid (VITAMIN C) 100 MG CHEW  Cyanocobalamin (VITAMIN B 12 PO)  famotidine (PEPCID) 20 MG tablet  folic acid (FOLVITE) 400 MCG tablet  loratadine (CLARITIN) 10 MG tablet  Multiple Vitamins-Minerals (BIO-35 GLUTEN-FREE PO)  nicotine (NICODERM CQ) 21 MG/24HR 24 hr patch  nicotine polacrilex (NICORETTE) 4 MG gum  sertraline (ZOLOFT) 50 MG tablet  TURMERIC PO        Surgical History   Past Surgical History:   Procedure Laterality Date    DISCECTOMY LUMBAR POSTERIOR MICROSCOPIC ONE LEVEL Left 6/29/2021    Procedure: Open left lumbar 5-sacral 1 hemilaminectomy and foraminotomy medial facetectomy, and microdiskectomy;  Surgeon: Rosie Araujo MD;  Location: [facility] OR    ESOPHAGOSCOPY, GASTROSCOPY, DUODENOSCOPY (EGD), COMBINED N/A 12/14/2021    Procedure: ESOPHAGOGASTRODUODENOSCOPY (EGD) (fv) biopsies with cold forceps;  Surgeon: Pamella  Norris WHITE MD;  Location:  GI    OTHER SURGICAL HISTORY Left 07/19/2018    left hip arthroscopic chondroplastydiagnostic arthroscopy, chondroplasty,synovectomy  see media section for details    TOTAL HIP ARTHROPLASTY Left     2018    TOTAL HIP ARTHROPLASTY Left 12/19/2018    Dr Colbert at [facility] [place]       Physical Exam     Patient Vitals for the past 24 hrs:   BP Temp Temp src Pulse Resp SpO2 Height Weight   04/05/25 2330 104/64 -- -- 70 15 97 % -- --   04/05/25 2300 101/70 -- -- 79 15 -- -- --   04/05/25 2230 103/75 -- -- 72 14 -- -- --   04/05/25 2130 111/67 -- -- 80 -- 92 % -- --   04/05/25 2115 -- -- -- 85 11 98 % -- --   04/05/25 2114 -- -- -- 80 10 97 % -- --   04/05/25 2113 -- -- -- 80 13 97 % -- --   04/05/25 2103 121/74 97.7  F (36.5  C) Oral 86 18 100 % 1.829 m (6') 79.4 kg (175 lb)   04/05/25 2100 121/74 -- -- 84 -- 100 % -- --   04/05/25 2058 126/79 -- -- 82 -- 99 % -- --     Physical Exam  General: Alert, no acute distress; well appearing.   HEENT:  Moist mucous membranes. Conjunctiva normal.   CV:  RRR, no m/r/g, skin warm and well perfused; 2+ bilateral radial pulses.   Pulm:  CTAB, no wheezes/ronchi/rales.  No acute distress, breathing comfortably  GI:  Soft, nontender, nondistended.  No rebound or guarding.   MSK:  Moving all extremities.  No focal areas of edema, erythema; reproducible anterior/left chest wall tenderness. No skin crepitus.   Skin:  WWP, no rashes, no lower extremity edema, skin color normal, no diaphoresis  Psych:  Well-appearing, normal affect, regular speech    Diagnostics     Lab Results   Labs Ordered and Resulted from Time of ED Arrival to Time of ED Departure   BASIC METABOLIC PANEL - Abnormal       Result Value    Sodium 141      Potassium 3.6      Chloride 101      Carbon Dioxide (CO2) 27      Anion Gap 13      Urea Nitrogen 15.0      Creatinine 1.32 (*)     GFR Estimate 68      Calcium 9.8      Glucose 76     CBC WITH PLATELETS AND DIFFERENTIAL -  Abnormal    WBC Count 7.7      RBC Count 4.76      Hemoglobin 13.1 (*)     Hematocrit 40.1      MCV 84      MCH 27.5      MCHC 32.7      RDW 13.8      Platelet Count 299      % Neutrophils 61      % Lymphocytes 31      % Monocytes 6      % Eosinophils 1      % Basophils 1      % Immature Granulocytes 0      NRBCs per 100 WBC 0      Absolute Neutrophils 4.7      Absolute Lymphocytes 2.4      Absolute Monocytes 0.4      Absolute Eosinophils 0.1      Absolute Basophils 0.1      Absolute Immature Granulocytes 0.0      Absolute NRBCs 0.0     TROPONIN T, HIGH SENSITIVITY - Normal    Troponin T, High Sensitivity 8     INFLUENZA A/B, RSV AND SARS-COV2 PCR - Normal    Influenza A PCR Negative      Influenza B PCR Negative      RSV PCR Negative      SARS CoV2 PCR Negative     TROPONIN T, HIGH SENSITIVITY - Normal    Troponin T, High Sensitivity 8     D DIMER QUANTITATIVE - Normal    D-Dimer Quantitative <0.27         Imaging   Chest XR,  PA & LAT   Final Result   IMPRESSION: Negative chest.          EKG   ECG results from 04/05/25   EKG 12-lead, tracing only     Value    Systolic Blood Pressure     Diastolic Blood Pressure     Ventricular Rate 82    Atrial Rate 82    ID Interval 138    QRS Duration 94        QTc 439    P Axis 63    R AXIS 45    T Axis 53    Interpretation ECG      Sinus rhythm with sinus arrhythmia  Normal ECG  When compared with ECG of 21-Nov-2018 09:12,  No significant change was found           Independent Interpretation   CXR: No pneumothorax, infiltrate, pleural effusion, cardiomegaly, or mediastinal widening.    ED Course      Medications Administered   Medications   ondansetron (ZOFRAN) injection 4 mg (4 mg Intravenous $Given 4/5/25 2110)       Procedures   Procedures     Discussion of Management   None    ED Course   ED Course as of 04/06/25 0244   Sat Apr 05, 2025 2143 I obtained history and examined the patient as noted above.        Additional Documentation  None    Medical Decision Making  / Diagnosis     CMS Diagnoses: None    MIPS       None    [facility]   Jimmy López III is a 45 year old male with history of anxiety/depression presenting to the emergency department for evaluation of left-sided chest pain that started at 10 AM and has been unrelenting.  See above for the details in the HPI and exam.  Patient is afebrile vitally stable.  Broad differential was considered including but is not limited to ACS, PE, acute aortic dissection, musculoskeletal etiology, GI etiology, anxiety amongst other things.  Fortunately work appears reassuring.  Screening EKG shows no acute ischemic appearing changes or signs of malignant dysrhythmia or pericarditis.  High-sensitivity troponin and 2-hour delta troponin are normal and flat making ACS unlikely.  Additionally, patient's heart score is low.  He is low risk for PE and D-dimer is negative making this unlikely.  The rest of his basic lab studies above show elevated creatinine (though appears baseline as previous have been 1.26, 1.33).  Chest x-ray obtained shows no acute pathology or mediastinal widening and I doubt aortic dissection based on history and exam.  He had clear reproducible chest wall tenderness with palpation to the anterior/left chest wall suggesting musculoskeletal etiology.  With reasonable clinical certainty given his reassuring workup, I do feel that he is safe to discharge home and he is comfortable with this plan.  He will follow-up closely with primary care doctor next week  to discuss his ER visit to consider possible cardiac stress testing.  Patient was reassured given the findings above and will follow-up closely with his regular doctor.  Discussed signs/symptoms that should prompt his urgent return to the emergency department.  All questions  were answered prior to discharge.     Disposition   The patient was discharged.     Diagnosis     ICD-10-CM    1. Chest pain, unspecified type  R07.9            Discharge Medications   Discharge  Medication List as of 4/5/2025 11:30 PM            Scribe Disclosure:  I, Loretta Wong, am serving as a scribe at 9:55 PM on 4/5/2025 to document services personally performed by Alessandro Luna MD based on my observations and the provider's statements to me.        Alessandro Luna MD  04/06/25 0247

## 2025-04-06 NOTE — ED TRIAGE NOTES
Pt here by EMS for left chest pain which started around 1000  at work and has gradually gotten worse through the day. Pt states feels like someone is sitting on his chest and radiates down left arm. Pt also states is difficulty to breathe deeply and endorses nausea. EMS gave baby aspirin x4, and nitroglycerin SL x3 which brought pain to a 1-2 from a 9. EKG with EMS Sinus Tach prior to nitroglycerin. Pt mildly diaphoretic upon arrival to ER. BG with .

## 2025-04-07 LAB
ATRIAL RATE - MUSE: 82 BPM
DIASTOLIC BLOOD PRESSURE - MUSE: NORMAL MMHG
INTERPRETATION ECG - MUSE: NORMAL
P AXIS - MUSE: 63 DEGREES
PR INTERVAL - MUSE: 138 MS
QRS DURATION - MUSE: 94 MS
QT - MUSE: 376 MS
QTC - MUSE: 439 MS
R AXIS - MUSE: 45 DEGREES
SYSTOLIC BLOOD PRESSURE - MUSE: NORMAL MMHG
T AXIS - MUSE: 53 DEGREES
VENTRICULAR RATE- MUSE: 82 BPM

## (undated) DEVICE — GLOVE PROTEXIS BLUE W/NEU-THERA 7.5  2D73EB75

## (undated) DEVICE — SU VICRYL 2-0 CT-2 27" UND J269H

## (undated) DEVICE — ENDO BITE BLOCK ADULT OLYMPUS LATEX FREE MAJ-1632

## (undated) DEVICE — Device

## (undated) DEVICE — SU VICRYL 1 CT-1 CR 8X18" J741D

## (undated) DEVICE — DRAPE C-ARM W/STRAPS 42X72" 07-CA104

## (undated) DEVICE — SUCTION MINISQUAIR SMOKE EVAC CAPTURE DEVICE SQ20012-01

## (undated) DEVICE — SU VICRYL 0 UR-6 27" J603H

## (undated) DEVICE — GLOVE PROTEXIS MICRO 7.0  2D73PM70

## (undated) DEVICE — SU VICRYL 2-0 CT-2 8X18" UND D8144

## (undated) DEVICE — SU MONOCRYL 4-0 PS-2 18" UND Y496G

## (undated) DEVICE — ADH SKIN CLOSURE PREMIERPRO EXOFIN 1.0ML 3470

## (undated) DEVICE — SUCTION MANIFOLD NEPTUNE 2 SYS 1 PORT 702-025-000

## (undated) DEVICE — LINEN GOWN X4 5410

## (undated) DEVICE — LINEN BACK PACK 5440

## (undated) DEVICE — SYR 20ML LL W/O NDL 302830

## (undated) DEVICE — DRSG PRIMAPORE 03 1/8X6" 66000318

## (undated) DEVICE — LINEN TOWEL PACK X30 5481

## (undated) DEVICE — SOL WATER IRRIG 1000ML BOTTLE 2F7114

## (undated) DEVICE — STPL SKIN PROXIMATE 35 WIDE PMW35

## (undated) DEVICE — DRAPE MICROSCOPE MICRO-KOVER LEICA 48"X120" 09-MK651

## (undated) DEVICE — SU VICRYL 1 CT-1 36" J347H

## (undated) DEVICE — TOOL DISSECT MIDAS MR8 14CM MATCH HEAD 3MM MR8-14MH30

## (undated) DEVICE — KIT ENDO TURNOVER/PROCEDURE W/CLEAN A SCOPE LINERS 103888

## (undated) DEVICE — SOL NACL 0.9% IRRIG 1000ML BOTTLE 2F7124

## (undated) DEVICE — POSITIONER ARMBOARD FOAM 1PAIR LF FP-ARMB1

## (undated) RX ORDER — OXYCODONE HYDROCHLORIDE 5 MG/1
TABLET ORAL
Status: DISPENSED
Start: 2021-06-29

## (undated) RX ORDER — FENTANYL CITRATE-0.9 % NACL/PF 10 MCG/ML
PLASTIC BAG, INJECTION (ML) INTRAVENOUS
Status: DISPENSED
Start: 2021-06-29

## (undated) RX ORDER — EPHEDRINE SULFATE 50 MG/ML
INJECTION, SOLUTION INTRAMUSCULAR; INTRAVENOUS; SUBCUTANEOUS
Status: DISPENSED
Start: 2021-06-29

## (undated) RX ORDER — PROPOFOL 10 MG/ML
INJECTION, EMULSION INTRAVENOUS
Status: DISPENSED
Start: 2021-06-29

## (undated) RX ORDER — LIDOCAINE HYDROCHLORIDE 20 MG/ML
INJECTION, SOLUTION EPIDURAL; INFILTRATION; INTRACAUDAL; PERINEURAL
Status: DISPENSED
Start: 2021-06-29

## (undated) RX ORDER — FENTANYL CITRATE 50 UG/ML
INJECTION, SOLUTION INTRAMUSCULAR; INTRAVENOUS
Status: DISPENSED
Start: 2021-06-29

## (undated) RX ORDER — ONDANSETRON 2 MG/ML
INJECTION INTRAMUSCULAR; INTRAVENOUS
Status: DISPENSED
Start: 2021-06-29

## (undated) RX ORDER — CEFAZOLIN SODIUM 2 G/100ML
INJECTION, SOLUTION INTRAVENOUS
Status: DISPENSED
Start: 2021-06-29

## (undated) RX ORDER — BUPIVACAINE HYDROCHLORIDE AND EPINEPHRINE 5; 5 MG/ML; UG/ML
INJECTION, SOLUTION PERINEURAL
Status: DISPENSED
Start: 2021-06-29

## (undated) RX ORDER — ACETAMINOPHEN 325 MG/1
TABLET ORAL
Status: DISPENSED
Start: 2021-06-29

## (undated) RX ORDER — HYDROMORPHONE HYDROCHLORIDE 1 MG/ML
INJECTION, SOLUTION INTRAMUSCULAR; INTRAVENOUS; SUBCUTANEOUS
Status: DISPENSED
Start: 2021-06-29

## (undated) RX ORDER — GABAPENTIN 300 MG/1
CAPSULE ORAL
Status: DISPENSED
Start: 2021-06-29

## (undated) RX ORDER — DEXAMETHASONE SODIUM PHOSPHATE 4 MG/ML
INJECTION, SOLUTION INTRA-ARTICULAR; INTRALESIONAL; INTRAMUSCULAR; INTRAVENOUS; SOFT TISSUE
Status: DISPENSED
Start: 2021-06-29